# Patient Record
Sex: FEMALE | Race: WHITE | NOT HISPANIC OR LATINO | URBAN - METROPOLITAN AREA
[De-identification: names, ages, dates, MRNs, and addresses within clinical notes are randomized per-mention and may not be internally consistent; named-entity substitution may affect disease eponyms.]

---

## 2020-08-07 ENCOUNTER — EMERGENCY (EMERGENCY)
Facility: HOSPITAL | Age: 60
LOS: 0 days | Discharge: HOME | End: 2020-08-07
Payer: COMMERCIAL

## 2020-08-07 VITALS
RESPIRATION RATE: 16 BRPM | SYSTOLIC BLOOD PRESSURE: 138 MMHG | TEMPERATURE: 98 F | OXYGEN SATURATION: 100 % | DIASTOLIC BLOOD PRESSURE: 59 MMHG | HEART RATE: 78 BPM

## 2020-08-07 VITALS
DIASTOLIC BLOOD PRESSURE: 59 MMHG | RESPIRATION RATE: 16 BRPM | HEART RATE: 78 BPM | SYSTOLIC BLOOD PRESSURE: 135 MMHG | OXYGEN SATURATION: 100 % | TEMPERATURE: 98 F

## 2020-08-07 DIAGNOSIS — Z20.828 CONTACT WITH AND (SUSPECTED) EXPOSURE TO OTHER VIRAL COMMUNICABLE DISEASES: ICD-10-CM

## 2020-08-07 DIAGNOSIS — B34.9 VIRAL INFECTION, UNSPECIFIED: ICD-10-CM

## 2020-08-07 DIAGNOSIS — Z88.5 ALLERGY STATUS TO NARCOTIC AGENT: ICD-10-CM

## 2020-08-07 DIAGNOSIS — R51 HEADACHE: ICD-10-CM

## 2020-08-07 PROCEDURE — 99283 EMERGENCY DEPT VISIT LOW MDM: CPT

## 2020-08-07 PROCEDURE — U0003: CPT

## 2020-08-07 NOTE — ED STATDOCS - PATIENT PORTAL LINK FT
You can access the FollowMyHealth Patient Portal offered by [facility] by registering at the following website: [email or web address]. By joining [employer]’s FollowMyHealth portal, you will also be able to view your health information using other applications (apps) compatible with our system.

## 2020-08-07 NOTE — ED STATDOCS - OBJECTIVE STATEMENT
Pt presents to ED with +headache no fever, no cough, no runny nose, no body aches, no sore throat x 2 days. Pt recently exposed to COVID-19. Pt here for testing.

## 2020-08-08 LAB — SARS-COV-2 RNA SPEC QL NAA+PROBE: SIGNIFICANT CHANGE UP

## 2021-05-16 ENCOUNTER — TRANSCRIPTION ENCOUNTER (OUTPATIENT)
Age: 61
End: 2021-05-16

## 2021-11-17 NOTE — ED ADULT TRIAGE NOTE - MODE OF ARRIVAL
Walk in Dapsone Counseling: I discussed with the patient the risks of dapsone including but not limited to hemolytic anemia, agranulocytosis, rashes, methemoglobinemia, kidney failure, peripheral neuropathy, headaches, GI upset, and liver toxicity.  Patients who start dapsone require monitoring including baseline LFTs and weekly CBCs for the first month, then every month thereafter.  The patient verbalized understanding of the proper use and possible adverse effects of dapsone.  All of the patient's questions and concerns were addressed. High Dose Vitamin A Pregnancy And Lactation Text: High dose vitamin A therapy is contraindicated during pregnancy and breast feeding. Tetracycline Counseling: Patient counseled regarding possible photosensitivity and increased risk for sunburn.  Patient instructed to avoid sunlight, if possible.  When exposed to sunlight, patients should wear protective clothing, sunglasses, and sunscreen.  The patient was instructed to call the office immediately if the following severe adverse effects occur:  hearing changes, easy bruising/bleeding, severe headache, or vision changes.  The patient verbalized understanding of the proper use and possible adverse effects of tetracycline.  All of the patient's questions and concerns were addressed. Patient understands to avoid pregnancy while on therapy due to potential birth defects. Use Enhanced Medication Counseling?: No Birth Control Pills Pregnancy And Lactation Text: This medication should be avoided if pregnant and for the first 30 days post-partum. Spironolactone Pregnancy And Lactation Text: This medication can cause feminization of the male fetus and should be avoided during pregnancy. The active metabolite is also found in breast milk. Sarecycline Counseling: Patient advised regarding possible photosensitivity and discoloration of the teeth, skin, lips, tongue and gums.  Patient instructed to avoid sunlight, if possible.  When exposed to sunlight, patients should wear protective clothing, sunglasses, and sunscreen.  The patient was instructed to call the office immediately if the following severe adverse effects occur:  hearing changes, easy bruising/bleeding, severe headache, or vision changes.  The patient verbalized understanding of the proper use and possible adverse effects of sarecycline.  All of the patient's questions and concerns were addressed. Doxycycline Pregnancy And Lactation Text: This medication is Pregnancy Category D and not consider safe during pregnancy. It is also excreted in breast milk but is considered safe for shorter treatment courses. Tazorac Pregnancy And Lactation Text: This medication is not safe during pregnancy. It is unknown if this medication is excreted in breast milk. Isotretinoin Counseling: Patient should get monthly blood tests, not donate blood, not drive at night if vision affected, not share medication, and not undergo elective surgery for 6 months after tx completed. Side effects reviewed, pt to contact office should one occur. Topical Sulfur Applications Counseling: Topical Sulfur Counseling: Patient counseled that this medication may cause skin irritation or allergic reactions.  In the event of skin irritation, the patient was advised to reduce the amount of the drug applied or use it less frequently.   The patient verbalized understanding of the proper use and possible adverse effects of topical sulfur application.  All of the patient's questions and concerns were addressed. Azithromycin Counseling:  I discussed with the patient the risks of azithromycin including but not limited to GI upset, allergic reaction, drug rash, diarrhea, and yeast infections. Tetracycline Pregnancy And Lactation Text: This medication is Pregnancy Category D and not consider safe during pregnancy. It is also excreted in breast milk. Erythromycin Counseling:  I discussed with the patient the risks of erythromycin including but not limited to GI upset, allergic reaction, drug rash, diarrhea, increase in liver enzymes, and yeast infections. Dapsone Pregnancy And Lactation Text: This medication is Pregnancy Category C and is not considered safe during pregnancy or breast feeding. Topical Retinoid Pregnancy And Lactation Text: This medication is Pregnancy Category C. It is unknown if this medication is excreted in breast milk. Erythromycin Pregnancy And Lactation Text: This medication is Pregnancy Category B and is considered safe during pregnancy. It is also excreted in breast milk. Birth Control Pills Counseling: Birth Control Pill Counseling: I discussed with the patient the potential side effects of OCPs including but not limited to increased risk of stroke, heart attack, thrombophlebitis, deep venous thrombosis, hepatic adenomas, breast changes, GI upset, headaches, and depression.  The patient verbalized understanding of the proper use and possible adverse effects of OCPs. All of the patient's questions and concerns were addressed. Topical Retinoid counseling:  Patient advised to apply a pea-sized amount only at bedtime and wait 30 minutes after washing their face before applying.  If too drying, patient may add a non-comedogenic moisturizer. The patient verbalized understanding of the proper use and possible adverse effects of retinoids.  All of the patient's questions and concerns were addressed. Tazorac Counseling:  Patient advised that medication is irritating and drying.  Patient may need to apply sparingly and wash off after an hour before eventually leaving it on overnight.  The patient verbalized understanding of the proper use and possible adverse effects of tazorac.  All of the patient's questions and concerns were addressed. Azithromycin Pregnancy And Lactation Text: This medication is considered safe during pregnancy and is also secreted in breast milk. Detail Level: Detailed Topical Clindamycin Pregnancy And Lactation Text: This medication is Pregnancy Category B and is considered safe during pregnancy. It is unknown if it is excreted in breast milk. Isotretinoin Pregnancy And Lactation Text: This medication is Pregnancy Category X and is considered extremely dangerous during pregnancy. It is unknown if it is excreted in breast milk. Topical Sulfur Applications Pregnancy And Lactation Text: This medication is Pregnancy Category C and has an unknown safety profile during pregnancy. It is unknown if this topical medication is excreted in breast milk. Minocycline Counseling: Patient advised regarding possible photosensitivity and discoloration of the teeth, skin, lips, tongue and gums.  Patient instructed to avoid sunlight, if possible.  When exposed to sunlight, patients should wear protective clothing, sunglasses, and sunscreen.  The patient was instructed to call the office immediately if the following severe adverse effects occur:  hearing changes, easy bruising/bleeding, severe headache, or vision changes.  The patient verbalized understanding of the proper use and possible adverse effects of minocycline.  All of the patient's questions and concerns were addressed. Benzoyl Peroxide Pregnancy And Lactation Text: This medication is Pregnancy Category C. It is unknown if benzoyl peroxide is excreted in breast milk. Topical Clindamycin Counseling: Patient counseled that this medication may cause skin irritation or allergic reactions.  In the event of skin irritation, the patient was advised to reduce the amount of the drug applied or use it less frequently.   The patient verbalized understanding of the proper use and possible adverse effects of clindamycin.  All of the patient's questions and concerns were addressed. High Dose Vitamin A Counseling: Side effects reviewed, pt to contact office should one occur. Bactrim Counseling:  I discussed with the patient the risks of sulfa antibiotics including but not limited to GI upset, allergic reaction, drug rash, diarrhea, dizziness, photosensitivity, and yeast infections.  Rarely, more serious reactions can occur including but not limited to aplastic anemia, agranulocytosis, methemoglobinemia, blood dyscrasias, liver or kidney failure, lung infiltrates or desquamative/blistering drug rashes. Spironolactone Counseling: Patient advised regarding risks of diarrhea, abdominal pain, hyperkalemia, birth defects (for female patients), liver toxicity and renal toxicity. The patient may need blood work to monitor liver and kidney function and potassium levels while on therapy. The patient verbalized understanding of the proper use and possible adverse effects of spironolactone.  All of the patient's questions and concerns were addressed. Benzoyl Peroxide Counseling: Patient counseled that medicine may cause skin irritation and bleach clothing.  In the event of skin irritation, the patient was advised to reduce the amount of the drug applied or use it less frequently.   The patient verbalized understanding of the proper use and possible adverse effects of benzoyl peroxide.  All of the patient's questions and concerns were addressed. Bactrim Pregnancy And Lactation Text: This medication is Pregnancy Category D and is known to cause fetal risk.  It is also excreted in breast milk. Doxycycline Counseling:  Patient counseled regarding possible photosensitivity and increased risk for sunburn.  Patient instructed to avoid sunlight, if possible.  When exposed to sunlight, patients should wear protective clothing, sunglasses, and sunscreen.  The patient was instructed to call the office immediately if the following severe adverse effects occur:  hearing changes, easy bruising/bleeding, severe headache, or vision changes.  The patient verbalized understanding of the proper use and possible adverse effects of doxycycline.  All of the patient's questions and concerns were addressed.

## 2022-03-10 ENCOUNTER — TRANSCRIPTION ENCOUNTER (OUTPATIENT)
Age: 62
End: 2022-03-10

## 2022-03-10 ENCOUNTER — INPATIENT (INPATIENT)
Facility: HOSPITAL | Age: 62
LOS: 0 days | Discharge: HOME | DRG: 69 | End: 2022-03-11
Attending: INTERNAL MEDICINE | Admitting: FAMILY MEDICINE
Payer: COMMERCIAL

## 2022-03-10 VITALS — WEIGHT: 156.53 LBS | SYSTOLIC BLOOD PRESSURE: 133 MMHG | DIASTOLIC BLOOD PRESSURE: 78 MMHG

## 2022-03-10 DIAGNOSIS — Z90.49 ACQUIRED ABSENCE OF OTHER SPECIFIED PARTS OF DIGESTIVE TRACT: Chronic | ICD-10-CM

## 2022-03-10 DIAGNOSIS — R20.2 PARESTHESIA OF SKIN: ICD-10-CM

## 2022-03-10 LAB
A1C WITH ESTIMATED AVERAGE GLUCOSE RESULT: 5.7 % — HIGH (ref 4–5.6)
ALBUMIN SERPL ELPH-MCNC: 3.8 G/DL — SIGNIFICANT CHANGE UP (ref 3.3–5)
ALP SERPL-CCNC: 70 U/L — SIGNIFICANT CHANGE UP (ref 40–120)
ALT FLD-CCNC: 40 U/L — SIGNIFICANT CHANGE UP (ref 12–78)
ANION GAP SERPL CALC-SCNC: 5 MMOL/L — SIGNIFICANT CHANGE UP (ref 5–17)
APTT BLD: 30 SEC — SIGNIFICANT CHANGE UP (ref 27.5–35.5)
AST SERPL-CCNC: 33 U/L — SIGNIFICANT CHANGE UP (ref 15–37)
BASOPHILS # BLD AUTO: 0.03 K/UL — SIGNIFICANT CHANGE UP (ref 0–0.2)
BASOPHILS NFR BLD AUTO: 0.4 % — SIGNIFICANT CHANGE UP (ref 0–2)
BILIRUB SERPL-MCNC: 0.3 MG/DL — SIGNIFICANT CHANGE UP (ref 0.2–1.2)
BUN SERPL-MCNC: 13 MG/DL — SIGNIFICANT CHANGE UP (ref 7–23)
CALCIUM SERPL-MCNC: 9.2 MG/DL — SIGNIFICANT CHANGE UP (ref 8.5–10.1)
CHLORIDE SERPL-SCNC: 108 MMOL/L — SIGNIFICANT CHANGE UP (ref 96–108)
CO2 SERPL-SCNC: 29 MMOL/L — SIGNIFICANT CHANGE UP (ref 22–31)
CREAT SERPL-MCNC: 0.9 MG/DL — SIGNIFICANT CHANGE UP (ref 0.5–1.3)
EGFR: 71 ML/MIN/1.73M2 — SIGNIFICANT CHANGE UP
EOSINOPHIL # BLD AUTO: 0.27 K/UL — SIGNIFICANT CHANGE UP (ref 0–0.5)
EOSINOPHIL NFR BLD AUTO: 4 % — SIGNIFICANT CHANGE UP (ref 0–6)
ESTIMATED AVERAGE GLUCOSE: 117 MG/DL — HIGH (ref 68–114)
FLUAV AG NPH QL: SIGNIFICANT CHANGE UP
FLUBV AG NPH QL: SIGNIFICANT CHANGE UP
GLUCOSE SERPL-MCNC: 100 MG/DL — HIGH (ref 70–99)
HCT VFR BLD CALC: 43.6 % — SIGNIFICANT CHANGE UP (ref 34.5–45)
HGB BLD-MCNC: 13.7 G/DL — SIGNIFICANT CHANGE UP (ref 11.5–15.5)
IMM GRANULOCYTES NFR BLD AUTO: 0.1 % — SIGNIFICANT CHANGE UP (ref 0–1.5)
INR BLD: 0.98 RATIO — SIGNIFICANT CHANGE UP (ref 0.88–1.16)
LYMPHOCYTES # BLD AUTO: 2.59 K/UL — SIGNIFICANT CHANGE UP (ref 1–3.3)
LYMPHOCYTES # BLD AUTO: 38 % — SIGNIFICANT CHANGE UP (ref 13–44)
MCHC RBC-ENTMCNC: 29.9 PG — SIGNIFICANT CHANGE UP (ref 27–34)
MCHC RBC-ENTMCNC: 31.4 GM/DL — LOW (ref 32–36)
MCV RBC AUTO: 95.2 FL — SIGNIFICANT CHANGE UP (ref 80–100)
MONOCYTES # BLD AUTO: 0.91 K/UL — HIGH (ref 0–0.9)
MONOCYTES NFR BLD AUTO: 13.3 % — SIGNIFICANT CHANGE UP (ref 2–14)
NEUTROPHILS # BLD AUTO: 3.01 K/UL — SIGNIFICANT CHANGE UP (ref 1.8–7.4)
NEUTROPHILS NFR BLD AUTO: 44.2 % — SIGNIFICANT CHANGE UP (ref 43–77)
PLATELET # BLD AUTO: 279 K/UL — SIGNIFICANT CHANGE UP (ref 150–400)
POTASSIUM SERPL-MCNC: 3.8 MMOL/L — SIGNIFICANT CHANGE UP (ref 3.5–5.3)
POTASSIUM SERPL-SCNC: 3.8 MMOL/L — SIGNIFICANT CHANGE UP (ref 3.5–5.3)
PROT SERPL-MCNC: 7.6 GM/DL — SIGNIFICANT CHANGE UP (ref 6–8.3)
PROTHROM AB SERPL-ACNC: 11.4 SEC — SIGNIFICANT CHANGE UP (ref 10.5–13.4)
RBC # BLD: 4.58 M/UL — SIGNIFICANT CHANGE UP (ref 3.8–5.2)
RBC # FLD: 13.2 % — SIGNIFICANT CHANGE UP (ref 10.3–14.5)
RSV RNA NPH QL NAA+NON-PROBE: SIGNIFICANT CHANGE UP
SARS-COV-2 RNA SPEC QL NAA+PROBE: SIGNIFICANT CHANGE UP
SODIUM SERPL-SCNC: 142 MMOL/L — SIGNIFICANT CHANGE UP (ref 135–145)
TROPONIN I, HIGH SENSITIVITY RESULT: 15.19 NG/L — SIGNIFICANT CHANGE UP
TROPONIN I, HIGH SENSITIVITY RESULT: 6.62 NG/L — SIGNIFICANT CHANGE UP
WBC # BLD: 6.82 K/UL — SIGNIFICANT CHANGE UP (ref 3.8–10.5)
WBC # FLD AUTO: 6.82 K/UL — SIGNIFICANT CHANGE UP (ref 3.8–10.5)

## 2022-03-10 PROCEDURE — 0042T: CPT

## 2022-03-10 PROCEDURE — 93306 TTE W/DOPPLER COMPLETE: CPT

## 2022-03-10 PROCEDURE — 36415 COLL VENOUS BLD VENIPUNCTURE: CPT

## 2022-03-10 PROCEDURE — 85027 COMPLETE CBC AUTOMATED: CPT

## 2022-03-10 PROCEDURE — 99285 EMERGENCY DEPT VISIT HI MDM: CPT

## 2022-03-10 PROCEDURE — 70551 MRI BRAIN STEM W/O DYE: CPT | Mod: ME

## 2022-03-10 PROCEDURE — 93010 ELECTROCARDIOGRAM REPORT: CPT

## 2022-03-10 PROCEDURE — G1004: CPT

## 2022-03-10 PROCEDURE — 83036 HEMOGLOBIN GLYCOSYLATED A1C: CPT

## 2022-03-10 PROCEDURE — 86803 HEPATITIS C AB TEST: CPT

## 2022-03-10 PROCEDURE — 97161 PT EVAL LOW COMPLEX 20 MIN: CPT | Mod: GP

## 2022-03-10 PROCEDURE — 99291 CRITICAL CARE FIRST HOUR: CPT

## 2022-03-10 PROCEDURE — 97116 GAIT TRAINING THERAPY: CPT | Mod: GP

## 2022-03-10 PROCEDURE — 80048 BASIC METABOLIC PNL TOTAL CA: CPT

## 2022-03-10 PROCEDURE — 84484 ASSAY OF TROPONIN QUANT: CPT

## 2022-03-10 PROCEDURE — 70551 MRI BRAIN STEM W/O DYE: CPT | Mod: 26

## 2022-03-10 PROCEDURE — 70496 CT ANGIOGRAPHY HEAD: CPT | Mod: 26,MA

## 2022-03-10 PROCEDURE — 99223 1ST HOSP IP/OBS HIGH 75: CPT

## 2022-03-10 PROCEDURE — 82962 GLUCOSE BLOOD TEST: CPT

## 2022-03-10 PROCEDURE — 70498 CT ANGIOGRAPHY NECK: CPT | Mod: 26,MA

## 2022-03-10 PROCEDURE — 80061 LIPID PANEL: CPT

## 2022-03-10 RX ORDER — BUPROPION HYDROCHLORIDE 150 MG/1
1 TABLET, EXTENDED RELEASE ORAL
Qty: 0 | Refills: 0 | DISCHARGE

## 2022-03-10 RX ORDER — AMLODIPINE BESYLATE 2.5 MG/1
5 TABLET ORAL DAILY
Refills: 0 | Status: DISCONTINUED | OUTPATIENT
Start: 2022-03-10 | End: 2022-03-11

## 2022-03-10 RX ORDER — LOSARTAN POTASSIUM 100 MG/1
50 TABLET, FILM COATED ORAL DAILY
Refills: 0 | Status: DISCONTINUED | OUTPATIENT
Start: 2022-03-10 | End: 2022-03-11

## 2022-03-10 RX ORDER — ATORVASTATIN CALCIUM 80 MG/1
20 TABLET, FILM COATED ORAL AT BEDTIME
Refills: 0 | Status: DISCONTINUED | OUTPATIENT
Start: 2022-03-10 | End: 2022-03-10

## 2022-03-10 RX ORDER — ONDANSETRON 8 MG/1
4 TABLET, FILM COATED ORAL EVERY 6 HOURS
Refills: 0 | Status: DISCONTINUED | OUTPATIENT
Start: 2022-03-10 | End: 2022-03-10

## 2022-03-10 RX ORDER — CHOLECALCIFEROL (VITAMIN D3) 125 MCG
1 CAPSULE ORAL
Qty: 0 | Refills: 0 | DISCHARGE

## 2022-03-10 RX ORDER — BUPROPION HYDROCHLORIDE 150 MG/1
300 TABLET, EXTENDED RELEASE ORAL DAILY
Refills: 0 | Status: DISCONTINUED | OUTPATIENT
Start: 2022-03-10 | End: 2022-03-11

## 2022-03-10 RX ORDER — METHYLPHENIDATE HCL 5 MG
1 TABLET ORAL
Qty: 0 | Refills: 0 | DISCHARGE

## 2022-03-10 RX ORDER — ATORVASTATIN CALCIUM 80 MG/1
1 TABLET, FILM COATED ORAL
Qty: 0 | Refills: 0 | DISCHARGE

## 2022-03-10 RX ORDER — ONDANSETRON 8 MG/1
4 TABLET, FILM COATED ORAL EVERY 6 HOURS
Refills: 0 | Status: DISCONTINUED | OUTPATIENT
Start: 2022-03-10 | End: 2022-03-11

## 2022-03-10 RX ORDER — METHYLPHENIDATE HCL 5 MG
36 TABLET ORAL DAILY
Refills: 0 | Status: DISCONTINUED | OUTPATIENT
Start: 2022-03-10 | End: 2022-03-11

## 2022-03-10 RX ORDER — ENOXAPARIN SODIUM 100 MG/ML
40 INJECTION SUBCUTANEOUS EVERY 24 HOURS
Refills: 0 | Status: DISCONTINUED | OUTPATIENT
Start: 2022-03-10 | End: 2022-03-11

## 2022-03-10 RX ORDER — FLUTICASONE PROPIONATE 50 MCG
1 SPRAY, SUSPENSION NASAL EVERY 12 HOURS
Refills: 0 | Status: DISCONTINUED | OUTPATIENT
Start: 2022-03-10 | End: 2022-03-11

## 2022-03-10 RX ORDER — FLUTICASONE PROPIONATE 50 MCG
1 SPRAY, SUSPENSION NASAL
Qty: 0 | Refills: 0 | DISCHARGE

## 2022-03-10 RX ORDER — OLMESARTAN MEDOXOMIL 5 MG/1
1 TABLET, FILM COATED ORAL
Qty: 0 | Refills: 0 | DISCHARGE

## 2022-03-10 RX ORDER — ACETAMINOPHEN 500 MG
650 TABLET ORAL EVERY 6 HOURS
Refills: 0 | Status: DISCONTINUED | OUTPATIENT
Start: 2022-03-10 | End: 2022-03-11

## 2022-03-10 RX ORDER — AMLODIPINE BESYLATE 2.5 MG/1
1 TABLET ORAL
Qty: 0 | Refills: 0 | DISCHARGE

## 2022-03-10 RX ORDER — MECLIZINE HCL 12.5 MG
25 TABLET ORAL EVERY 6 HOURS
Refills: 0 | Status: DISCONTINUED | OUTPATIENT
Start: 2022-03-10 | End: 2022-03-11

## 2022-03-10 RX ORDER — LANOLIN ALCOHOL/MO/W.PET/CERES
3 CREAM (GRAM) TOPICAL ONCE
Refills: 0 | Status: COMPLETED | OUTPATIENT
Start: 2022-03-10 | End: 2022-03-10

## 2022-03-10 RX ORDER — ASPIRIN/CALCIUM CARB/MAGNESIUM 324 MG
81 TABLET ORAL DAILY
Refills: 0 | Status: DISCONTINUED | OUTPATIENT
Start: 2022-03-10 | End: 2022-03-11

## 2022-03-10 RX ORDER — ATORVASTATIN CALCIUM 80 MG/1
80 TABLET, FILM COATED ORAL AT BEDTIME
Refills: 0 | Status: DISCONTINUED | OUTPATIENT
Start: 2022-03-10 | End: 2022-03-11

## 2022-03-10 RX ORDER — PANTOPRAZOLE SODIUM 20 MG/1
40 TABLET, DELAYED RELEASE ORAL
Refills: 0 | Status: DISCONTINUED | OUTPATIENT
Start: 2022-03-10 | End: 2022-03-11

## 2022-03-10 RX ADMIN — Medication 81 MILLIGRAM(S): at 17:44

## 2022-03-10 RX ADMIN — ATORVASTATIN CALCIUM 80 MILLIGRAM(S): 80 TABLET, FILM COATED ORAL at 21:49

## 2022-03-10 RX ADMIN — AMLODIPINE BESYLATE 5 MILLIGRAM(S): 2.5 TABLET ORAL at 17:43

## 2022-03-10 RX ADMIN — ENOXAPARIN SODIUM 40 MILLIGRAM(S): 100 INJECTION SUBCUTANEOUS at 15:18

## 2022-03-10 RX ADMIN — Medication 3 MILLIGRAM(S): at 21:49

## 2022-03-10 RX ADMIN — LOSARTAN POTASSIUM 50 MILLIGRAM(S): 100 TABLET, FILM COATED ORAL at 17:44

## 2022-03-10 RX ADMIN — Medication 1 SPRAY(S): at 21:48

## 2022-03-10 NOTE — PHYSICAL THERAPY INITIAL EVALUATION ADULT - GENERAL OBSERVATIONS, REHAB EVAL
pt rec'd supine in bed on 3E, HM, dtr arriving. pt reports feeling better, no dizziness, still paresthesia LUE.

## 2022-03-10 NOTE — PATIENT PROFILE ADULT - IS PATIENT POST-MENOPAUSAL?
Phyllis Loving NP calling re: abnormal echo with changes from previous echo done on Mr narayan.    Spoke with Ludivina BREWER reviewed Phyllis Lyles concerns.  States that EF has improved. Echo does show changes from previous. Instructed that per Ludivina ok to f/u in 2 wks unless patient becomes symptomatic such as SOB.    Information given to Phyllis BREWER                 yes

## 2022-03-10 NOTE — ED ADULT TRIAGE NOTE - CHIEF COMPLAINT QUOTE
Pt p/w c/o left arm weakness and left facial numbness/tingling started approximately 45 minutes ago +BEFAST, code stroke activated.

## 2022-03-10 NOTE — ED PROVIDER NOTE - CROS ED NEURO ALL NEG
ADULT NUTRITION INITIAL ASSESSMENT    Pt is at moderate nutrition risk. Pt does not meet malnutrition criteria.       RECOMMENDATIONS TO MD:  See Nutrition Intervention     NUTRITION DIAGNOSIS/PROBLEM:  Altered GI function related to alteration in gastro N/A  Intake: N/A    Intake Meeting Needs: No    Food Allergies: No  Cultural/Ethnic/Yazidism Preferences: Not Obtained    MEDICATIONS: reviewed    LABS: reviewed    NUTRITION RELATED PHYSICAL FINDINGS:  - Body Fat/Muscle Mass: well nourished per visual ex - - -

## 2022-03-10 NOTE — STROKE CODE NOTE - NIH STROKE SCALE: 3. VISUAL, QM
Pneumonia
Atrial fibrillation, unspecified type
Atrial fibrillation, unspecified type
HTN (hypertension)
(0) No visual loss

## 2022-03-10 NOTE — STROKE CODE NOTE - NSMDCONSULT QTN_Y FT
Patient is a 62y old Female who presents with a chief complaint of left arm numbness and left facial numbness, dizziness    Time patient arrived to ED: 11:47    HPI: 62 yr old F with PMHx of HTN, HLD, and depression presented to ED on 3/10/22 at 11:47 am with c/o left arm paresthesias and left facial paresthesias as well as dizziness, LKN was at 2 pm on 3/9. Patient states yesterday afternoon she began to experience dizziness and nausea, room felt like it was spinning and dizziness worsened with head movement, which lasted for about 4-5 hours. When she woke up today, dizziness had improved but her head still felt 'fuzzy,' and then about an hr before she arrived in ED she began to experience numbness and tingling in her left arm, followed by left facial numbness and tingling. Her daughter insisted she come to ED d/t worsening symptoms.    In ED, code stroke was called. CTH showed no acute infarct or hemorrhage, CTA head and neck showed no LVO, stenosis or aneurysms, CT perfusion showed no core infarct. IV TPA was not given because patient was out of the appropriate time window for IV alteplase, sx had lasted > 4.5 hrs, and pt's sx were minor and non disabling, NIHSS 1.      Upon evaluation, patient is awake and alert, denies dizziness but admits to left facial paresthesias, as well as left arm numbness/tingling. She does admit to neck pain, but denies radiating pain from neck to LUE. Reports she gets neck pain when stressed. Currently she denies any headache, visual changes, changes in speech. She had a similar episode of vertigo in 2016, but is not sure what caused it. She does not take baby ASA at home but did take a baby ASA prior to arriving here.    PAST MEDICAL & SURGICAL HISTORY:  HTN  HLD  Depression    FAMILY HISTORY: Noncontributory     Social Hx:  Nonsmoker, no drug or alcohol use    Allergies:  Demerol HCl (Unknown)    MEDICATIONS  (Home):  Amlodipine  Statin  Wellbutrin  Losartan    ROS: Pertinent positives in HPI, all other ROS were reviewed and are negative.      Vital Signs Last 24 Hrs  T(C): 36.7 (10 Mar 2022 12:11), Max: 36.7 (10 Mar 2022 12:11)  T(F): 98.1 (10 Mar 2022 12:11), Max: 98.1 (10 Mar 2022 12:11)  HR: 81 (10 Mar 2022 12:11) (81 - 81)  BP: 159/99 (10 Mar 2022 12:11) (133/78 - 159/99)  BP(mean): --  RR: --  SpO2: 100% (10 Mar 2022 12:11) (100% - 100%)    Physical Exam:  Gen: NAD, normocephalic  HEENT: PERRLA, EOMI  Neck: Supple  Respiratory: Breath sounds are clear bilaterally  Cardiovascular: S1 and S2, regular rhythm  Extremities:  no edema  Vascular: No carotid Bruit  Musculoskeletal: no joint swelling/tenderness, no abnormal movements  Skin: No rashes    Neurological Exam:  HF: A x O x 3, appropriately interactive, normal affect, speech fluent, no aphasia or paraphasic errors. Naming /repetition intact   CN: PERRL, EOMI, VFF, facial sensation decreased on left side compared to right side, no NLFD, tongue midline  Motor: No pronator drift, Strength 5/5 in all 4 ext, normal bulk and tone, no tremor, rigidity or bradykinesia  Sens: Intact to light touch throughout except for face  Reflexes: Symmetric and normal, downgoing toes b/l  Coord:  No FNFA, dysmetria  Gait/Balance: Cannot test    NIHSS- 1 for subjective sensory deficit    RESULT SUMMARY:  1 points  NIH Stroke Scale      INPUTS:  1A: Level of consciousness —> 0 = Alert; keenly responsive  1B: Ask month and age —> 0 = Both questions right  1C: 'Blink eyes' & 'squeeze hands' —> 0 = Performs both tasks  2: Horizontal extraocular movements —> 0 = Normal  3: Visual fields —> 0 = No visual loss  4: Facial palsy —> 0 = Normal symmetry  5A: Left arm motor drift —> 0 = No drift for 10 seconds  5B: Right arm motor drift —> 0 = No drift for 10 seconds  6A: Left leg motor drift —> 0 = No drift for 5 seconds  6B: Right leg motor drift —> 0 = No drift for 5 seconds  7: Limb Ataxia —> 0 = No ataxia  8: Sensation —> 1 = Mild-moderate loss: less sharp/more dull   9: Language/aphasia —> 0 = Normal; no aphasia  10: Dysarthria —> 0 = Normal  11: Extinction/inattention —> 0 = No abnormality    RESULT SUMMARY:  0 points  Modified [place] Scale      INPUTS:  Patient's Baseline Activity —> 0 = No symptoms at all    Labs:                        13.7   6.82  )-----------( 279      ( 10 Mar 2022 12:00 )             43.6       Radiology:  CT head/CTA head and neck/CT Perfusion reports (3/10/22):        A/P: 62 yr old F with PMHx of HTN, HLD, and depression presented to ED on 3/10/22 at 11:47 am with c/o left arm paresthesias and left facial paresthesias as well as dizziness, LKN was at 2 pm on 3/9.    CTH showed no acute infarct or hemorrhage, CTA head and neck showed no LVO, stenosis or aneurysms, CT perfusion showed no core infarct.    IV TPA was not given because patient was out of the appropriate time window for IV alteplase, sx had lasted > 4.5 hrs, and pt's sx were minor and non disabling, NIHSS 1.      #R/o acute right sided CVA, pt with risk factors    - MRI head ordered  - ASA 81 mg QD  - Atorvastatin, lipid profile, Hgb A1c within 24 hours  - Monitor HTN- maintain systolic bp 170-190, no less than 120  - Neuro checks Q4h  - Vital signs Q4h  - Echo  - PT eval  - Dysphagia screen today  - DVT prophylaxis  - Telemonitoring    Patient was discussed with Dr. Alegre Patient is a 62y old Female who presents with a chief complaint of left arm numbness and left facial numbness, dizziness    Time patient arrived to ED: 11:47    HPI: 62 yr old F with PMHx of HTN, HLD, and depression presented to ED on 3/10/22 at 11:47 am with c/o left arm paresthesias and left facial paresthesias as well as dizziness, LKN was at 2 pm on 3/9. Patient states yesterday afternoon she began to experience dizziness and nausea, room felt like it was spinning and dizziness worsened with head movement, which lasted for about 4-5 hours. When she woke up today, dizziness had improved but her head still felt 'fuzzy,' and then about an hr before she arrived in ED she began to experience numbness and tingling in her left arm, followed by left facial numbness and tingling. Her daughter insisted she come to ED d/t worsening symptoms.    In ED, code stroke was called. CTH showed no acute infarct or hemorrhage, CTA head and neck showed no LVO, stenosis or aneurysms, CT perfusion showed no core infarct. IV TPA was not given because patient was out of the appropriate time window for IV alteplase, sx had lasted > 4.5 hrs, and pt's sx were minor and non disabling, NIHSS 1.      Upon evaluation, patient is awake and alert, denies dizziness but admits to left facial paresthesias, as well as left arm numbness/tingling. She does admit to neck pain, but denies radiating pain from neck to LUE. Reports she gets neck pain when stressed. Currently she denies any headache, visual changes, changes in speech. She had a similar episode of vertigo in 2016, but is not sure what caused it. She does not take baby ASA at home but did take a baby ASA prior to arriving here.    PAST MEDICAL & SURGICAL HISTORY:  HTN  HLD  Depression    FAMILY HISTORY: Noncontributory     Social Hx:  Nonsmoker, no drug or alcohol use    Allergies:  Demerol HCl (Unknown)    MEDICATIONS  (Home):  Amlodipine  Statin  Wellbutrin  Losartan    ROS: Pertinent positives in HPI, all other ROS were reviewed and are negative.      Vital Signs Last 24 Hrs  T(C): 36.7 (10 Mar 2022 12:11), Max: 36.7 (10 Mar 2022 12:11)  T(F): 98.1 (10 Mar 2022 12:11), Max: 98.1 (10 Mar 2022 12:11)  HR: 81 (10 Mar 2022 12:11) (81 - 81)  BP: 159/99 (10 Mar 2022 12:11) (133/78 - 159/99)  BP(mean): --  RR: --  SpO2: 100% (10 Mar 2022 12:11) (100% - 100%)    Physical Exam:  Gen: NAD, normocephalic  HEENT: PERRLA, EOMI  Neck: Supple  Respiratory: Breath sounds are clear bilaterally  Cardiovascular: S1 and S2, regular rhythm  Extremities:  no edema  Vascular: No carotid Bruit  Musculoskeletal: no joint swelling/tenderness, no abnormal movements  Skin: No rashes    Neurological Exam:  HF: A x O x 3, appropriately interactive, normal affect, speech fluent, no aphasia or paraphasic errors. Naming /repetition intact   CN: PERRL, EOMI, VFF, facial sensation decreased on left side compared to right side, no NLFD, tongue midline  Motor: No pronator drift, Strength 5/5 in all 4 ext, normal bulk and tone, no tremor, rigidity or bradykinesia  Sens: Intact to light touch throughout except for face  Reflexes: Symmetric and normal, downgoing toes b/l  Coord:  No FNFA, dysmetria  Gait/Balance: Cannot test    NIHSS- 1 for subjective sensory deficit    RESULT SUMMARY:  1 points  NIH Stroke Scale      INPUTS:  1A: Level of consciousness —> 0 = Alert; keenly responsive  1B: Ask month and age —> 0 = Both questions right  1C: 'Blink eyes' & 'squeeze hands' —> 0 = Performs both tasks  2: Horizontal extraocular movements —> 0 = Normal  3: Visual fields —> 0 = No visual loss  4: Facial palsy —> 0 = Normal symmetry  5A: Left arm motor drift —> 0 = No drift for 10 seconds  5B: Right arm motor drift —> 0 = No drift for 10 seconds  6A: Left leg motor drift —> 0 = No drift for 5 seconds  6B: Right leg motor drift —> 0 = No drift for 5 seconds  7: Limb Ataxia —> 0 = No ataxia  8: Sensation —> 1 = Mild-moderate loss: less sharp/more dull   9: Language/aphasia —> 0 = Normal; no aphasia  10: Dysarthria —> 0 = Normal  11: Extinction/inattention —> 0 = No abnormality    RESULT SUMMARY:  0 points  Modified [place] Scale      INPUTS:  Patient's Baseline Activity —> 0 = No symptoms at all    Labs:                        13.7   6.82  )-----------( 279      ( 10 Mar 2022 12:00 )             43.6       Radiology:  CT head/CTA head and neck/CT Perfusion reports (3/10/22):        A/P: 62 yr old F with PMHx of HTN, HLD, and depression presented to ED on 3/10/22 at 11:47 am with c/o left arm paresthesias and left facial paresthesias as well as dizziness, LKN was at 2 pm on 3/9.    CTH showed no acute infarct or hemorrhage, CTA head and neck showed no LVO, stenosis or aneurysms, CT perfusion showed no core infarct.    IV TPA was not given because patient was out of the appropriate time window for IV alteplase, sx had lasted > 4.5 hrs, and pt's sx were minor and non disabling, NIHSS 1.      #R/o acute right sided CVA, pt with risk factors    - MRI head ordered  - ASA 81 mg QD  - Atorvastatin, lipid profile, Hgb A1c within 24 hours  - Monitor HTN- maintain systolic bp 170-190, no less than 120  - Neuro checks Q4h  - Vital signs Q4h  - Monitor blood glucose Q6h for first 24 hrs  - Echo  - PT eval  - Dysphagia screen today  - DVT prophylaxis  - Telemonitoring    Patient was discussed with Dr. Alegre Patient is a 62y old Female who presents with a chief complaint of left arm numbness and left facial numbness, dizziness    Time patient arrived to ED: 11:47    HPI: 62 yr old F with PMHx of HTN, HLD, and depression presented to ED on 3/10/22 at 11:47 am with c/o left arm paresthesias and left facial paresthesias as well as dizziness, LKN was at 2 pm on 3/9. Patient states yesterday afternoon she began to experience dizziness and nausea, room felt like it was spinning and dizziness worsened with head movement, which lasted for about 4-5 hours. When she woke up today, dizziness had improved but her head still felt 'fuzzy,' and then about an hr before she arrived in ED she began to experience numbness and tingling in her left arm, followed by left facial numbness and tingling. Her daughter insisted she come to ED d/t worsening symptoms.    In ED, code stroke was called. CTH showed no acute infarct or hemorrhage, CTA head and neck showed no LVO, stenosis or aneurysms, CT perfusion showed no core infarct. IV TPA was not given because patient was out of the appropriate time window for IV alteplase, sx had lasted > 4.5 hrs, and pt's sx were minor and non disabling, NIHSS 1.      Upon evaluation, patient is awake and alert, denies dizziness but admits to left facial paresthesias, as well as left arm numbness/tingling. She does admit to neck pain, but denies radiating pain from neck to LUE. Reports she gets neck pain when stressed. Currently she denies any headache, visual changes, changes in speech. She had a similar episode of vertigo in 2016, but is not sure what caused it. She does not take baby ASA at home but did take a baby ASA prior to arriving here.    PAST MEDICAL & SURGICAL HISTORY:  HTN  HLD  Depression    FAMILY HISTORY: Noncontributory     Social Hx:  Nonsmoker, no drug or alcohol use    Allergies:  Demerol HCl (Unknown)    MEDICATIONS  (Home):  Amlodipine  Statin  Wellbutrin  Losartan    ROS: Pertinent positives in HPI, all other ROS were reviewed and are negative.      Vital Signs Last 24 Hrs  T(C): 36.7 (10 Mar 2022 12:11), Max: 36.7 (10 Mar 2022 12:11)  T(F): 98.1 (10 Mar 2022 12:11), Max: 98.1 (10 Mar 2022 12:11)  HR: 81 (10 Mar 2022 12:11) (81 - 81)  BP: 159/99 (10 Mar 2022 12:11) (133/78 - 159/99)  BP(mean): --  RR: --  SpO2: 100% (10 Mar 2022 12:11) (100% - 100%)    Physical Exam:  Gen: NAD, normocephalic  HEENT: PERRLA, EOMI  Neck: Supple  Respiratory: Breath sounds are clear bilaterally  Cardiovascular: S1 and S2, regular rhythm  Extremities:  no edema  Vascular: No carotid Bruit  Musculoskeletal: no joint swelling/tenderness, no abnormal movements  Skin: No rashes    Neurological Exam:  HF: A x O x 3, appropriately interactive, normal affect, speech fluent, no aphasia or paraphasic errors. Naming /repetition intact   CN: PERRL, EOMI, VFF, facial sensation decreased on left side compared to right side, no NLFD, tongue midline  Motor: No pronator drift, Strength 5/5 in all 4 ext, normal bulk and tone, no tremor, rigidity or bradykinesia  Sens: Intact to light touch throughout except for face  Reflexes: Symmetric and normal, downgoing toes b/l  Coord:  No FNFA, dysmetria  Gait/Balance: Cannot test    NIHSS- 1 for subjective sensory deficit    RESULT SUMMARY:  1 points  NIH Stroke Scale      INPUTS:  1A: Level of consciousness —> 0 = Alert; keenly responsive  1B: Ask month and age —> 0 = Both questions right  1C: 'Blink eyes' & 'squeeze hands' —> 0 = Performs both tasks  2: Horizontal extraocular movements —> 0 = Normal  3: Visual fields —> 0 = No visual loss  4: Facial palsy —> 0 = Normal symmetry  5A: Left arm motor drift —> 0 = No drift for 10 seconds  5B: Right arm motor drift —> 0 = No drift for 10 seconds  6A: Left leg motor drift —> 0 = No drift for 5 seconds  6B: Right leg motor drift —> 0 = No drift for 5 seconds  7: Limb Ataxia —> 0 = No ataxia  8: Sensation —> 1 = Mild-moderate loss: less sharp/more dull   9: Language/aphasia —> 0 = Normal; no aphasia  10: Dysarthria —> 0 = Normal  11: Extinction/inattention —> 0 = No abnormality    RESULT SUMMARY:  0 points  Modified [place] Scale      INPUTS:  Patient's Baseline Activity —> 0 = No symptoms at all    Labs:                        13.7   6.82  )-----------( 279      ( 10 Mar 2022 12:00 )             43.6       Radiology:  CT head/CTA head and neck/CT Perfusion reports (3/10/22):    IMPRESSION:    HEAD CT: No acute intracranial hemorrhage or acute territorial infarction.    CT PERFUSION demonstrated: No asymmetric or territorial perfusion   abnormality.    If symptoms persist consider follow up head CT or MRI, MRA  if no   contraindication.    CTA COW:  Patent intracranial circulation without flow limiting stenosis   or large vessel occlusion.    CTA NECK: Patent, ECAs, ICAs, no  hemodynamically significant stenosis at    ICA origins by NASCET criteria.  Bilateral vertebral arteries are patent without flow limiting stenosis.      A/P: 62 yr old F with PMHx of HTN, HLD, and depression presented to ED on 3/10/22 at 11:47 am with c/o left arm paresthesias and left facial paresthesias as well as dizziness, LKN was at 2 pm on 3/9.    CTH showed no acute infarct or hemorrhage, CTA head and neck showed no LVO, stenosis or aneurysms, CT perfusion showed no core infarct.    IV TPA was not given because patient was out of the appropriate time window for IV alteplase, sx had lasted > 4.5 hrs, and pt's sx were minor and non disabling, NIHSS 1.      #R/o acute right sided CVA, pt with risk factors    - MRI head ordered  - ASA 81 mg QD  - Atorvastatin, lipid profile, Hgb A1c within 24 hours  - Monitor HTN- maintain systolic bp 170-190, no less than 120  - Neuro checks Q4h  - Vital signs Q4h  - Monitor blood glucose Q6h for first 24 hrs  - Echo  - PT eval  - Dysphagia screen today  - DVT prophylaxis  - Telemonitoring    Patient was discussed with Dr. Alegre and Dr. Miller

## 2022-03-10 NOTE — DISCHARGE NOTE NURSING/CASE MANAGEMENT/SOCIAL WORK - NSDCPEFALRISK_GEN_ALL_CORE
For information on Fall & Injury Prevention, visit: [email or web address] OR  [email or web address] OR  https://www.cdc.gov/steadi/patient.html

## 2022-03-10 NOTE — PHYSICAL THERAPY INITIAL EVALUATION ADULT - MANUAL MUSCLE TESTING RESULTS, REHAB EVAL
slight (<1/2 gr.) difference LUE < R likely d/t hand dominance-R HD)/no strength deficits were identified

## 2022-03-10 NOTE — ED PROVIDER NOTE - CLINICAL SUMMARY MEDICAL DECISION MAKING FREE TEXT BOX
62 year old femael with neuro symptoms that have resolved on their own, not a TPA candidate, seen by neuro and admission recommended.

## 2022-03-10 NOTE — ED PROVIDER NOTE - CRITICAL CARE ATTENDING CONTRIBUTION TO CARE
62 year old female with left arm and face tingling, normal motor function here and pt is asymptomatic so TPA is not indicated. Seen by neuro. Precautions reviewed.

## 2022-03-10 NOTE — ED PROVIDER NOTE - OBJECTIVE STATEMENT
61 y/o female with no pertinent PMHx presents to ED c/o left arm weakness and left facial numbing/tingling approx 45 min PTA. Pt reports episode of dizziness yesterday around 2pm, persistent for a few hours. Also with left sided neck discomfort, reports similar pain when stressed. Denies difficulty walking, changes in vision, confusion.

## 2022-03-10 NOTE — ED PROVIDER NOTE - NEUROLOGICAL, MLM
Alert and oriented, no focal deficits, no motor or sensory deficits, CN 2-12 grossly intact, normal finger to nose, no pronator drift

## 2022-03-10 NOTE — ED ADULT NURSE NOTE - OBJECTIVE STATEMENT
pt presents to ed ambulatory for evaluation of left arm weakness and left facial numbness/tingling approx. 45 PTA- pt reports persistent dizziness since 2pm yesterday. in ed , pt ambulatory steady gait, gcs 15, no change in vision. NO TPA as per neuro pa and physician at bedside. pt vitals stable , ekg done,

## 2022-03-10 NOTE — H&P ADULT - ASSESSMENT
1. Dizziness/vertigo + LUE/Left facial numbness and chest sensation - more consistent with peripheral vestibulopathy, but can't rule out central etiology - obtain MRI brain, cont statin and ASA    2. LUE/Left facial numbness and chest sensation - f/u series CE, TTE   - no acute EKG changes found    3. HTN - cont current meds    4. Depression on wellbutrin    5. VTE proph - LMWH    Discussed with  at bedside.  Time spent 56 min

## 2022-03-10 NOTE — PATIENT PROFILE ADULT - FALL HARM RISK - HARM RISK INTERVENTIONS
Communicate Risk of Fall with Harm to all staff/Monitor gait and stability/Reinforce activity limits and safety measures with patient and family/Tailored Fall Risk Interventions/Visual Cue: Yellow wristband and red socks/Bed in lowest position, wheels locked, appropriate side rails in place/Call bell, personal items and telephone in reach/Instruct patient to call for assistance before getting out of bed or chair/Non-slip footwear when patient is out of bed/[place] to call system/Physically safe environment - no spills, clutter or unnecessary equipment/Purposeful Proactive Rounding/Room/bathroom lighting operational, light cord in reach

## 2022-03-10 NOTE — H&P ADULT - HISTORY OF PRESENT ILLNESS
62 y.o. female with PMHx of HTN/HLD/Depression p/w sudden onset of severe dizziness with inability to ambulate and severe nausea day prior improved with dramamine followed by LUE/Left facial numbness this am - now is better. Pt also reported some lower subxyphoid chest sensation - resolved now.

## 2022-03-10 NOTE — PHARMACOTHERAPY INTERVENTION NOTE - COMMENTS
Medication history complete, [facility] with [facility]

## 2022-03-10 NOTE — H&P ADULT - NSHPPHYSICALEXAM_GEN_ALL_CORE
PHYSICAL EXAM:    General: Well developed; well nourished; in no acute distress  Eyes: PERRLA, EOMI; conjunctiva and sclera clear  Head: Normocephalic; atraumatic  ENMT: No nasal discharge; airway clear, + [place] maneuver  Neck: Supple; non tender; no masses  Respiratory: No wheezes, rales or rhonchi  Cardiovascular: Regular rate and rhythm. S1 and S2 Normal; No murmurs, gallops or rubs  Gastrointestinal: Soft non-tender non-distended; Normal bowel sounds  Genitourinary: No costovertebral angle tenderness  Extremities: Normal range of motion, No clubbing, cyanosis or edema  Vascular: Peripheral pulses palpable 2+ bilaterally  Neurological: Alert and oriented x4, brisk LE DTRs, no deficits  Skin: Warm and dry.   Musculoskeletal: Normal tone, without deformities  Psychiatric: Cooperative and appropriate

## 2022-03-10 NOTE — PHYSICAL THERAPY INITIAL EVALUATION ADULT - PERTINENT HX OF CURRENT PROBLEM, REHAB EVAL
pt to ED on 3/10/22  a.m w/ c/o L arm paresthesias and left facial paresthesias as well as dizziness, LKN was at 2 pm on 3/9. yesterday pt w/ dizziness and nausea, dizziness worsened with head movement, which lasted for about 4-5 hours. also had c/o substernal "sensation". In ED, code stroke was called. CTH showed no acute infarct or hemorrhage, CTA head and neck showed no LVO, stenosis or aneurysms, CT perfusion showed no core infarct. trop neg. no EKG changes. MRI pending.

## 2022-03-11 ENCOUNTER — TRANSCRIPTION ENCOUNTER (OUTPATIENT)
Age: 62
End: 2022-03-11

## 2022-03-11 VITALS
DIASTOLIC BLOOD PRESSURE: 50 MMHG | HEART RATE: 69 BPM | RESPIRATION RATE: 18 BRPM | OXYGEN SATURATION: 99 % | SYSTOLIC BLOOD PRESSURE: 110 MMHG | TEMPERATURE: 98 F

## 2022-03-11 DIAGNOSIS — I49.3 VENTRICULAR PREMATURE DEPOLARIZATION: ICD-10-CM

## 2022-03-11 DIAGNOSIS — R07.89 OTHER CHEST PAIN: ICD-10-CM

## 2022-03-11 LAB
ANION GAP SERPL CALC-SCNC: 4 MMOL/L — LOW (ref 5–17)
BUN SERPL-MCNC: 14 MG/DL — SIGNIFICANT CHANGE UP (ref 7–23)
CALCIUM SERPL-MCNC: 9.2 MG/DL — SIGNIFICANT CHANGE UP (ref 8.5–10.1)
CHLORIDE SERPL-SCNC: 107 MMOL/L — SIGNIFICANT CHANGE UP (ref 96–108)
CHOLEST SERPL-MCNC: 180 MG/DL — SIGNIFICANT CHANGE UP
CO2 SERPL-SCNC: 29 MMOL/L — SIGNIFICANT CHANGE UP (ref 22–31)
CREAT SERPL-MCNC: 0.82 MG/DL — SIGNIFICANT CHANGE UP (ref 0.5–1.3)
EGFR: 80 ML/MIN/1.73M2 — SIGNIFICANT CHANGE UP
GLUCOSE SERPL-MCNC: 98 MG/DL — SIGNIFICANT CHANGE UP (ref 70–99)
HCT VFR BLD CALC: 40.9 % — SIGNIFICANT CHANGE UP (ref 34.5–45)
HCV AB S/CO SERPL IA: 0.06 S/CO — SIGNIFICANT CHANGE UP (ref 0–0.99)
HCV AB SERPL-IMP: SIGNIFICANT CHANGE UP
HDLC SERPL-MCNC: 83 MG/DL — SIGNIFICANT CHANGE UP
HGB BLD-MCNC: 13.2 G/DL — SIGNIFICANT CHANGE UP (ref 11.5–15.5)
LIPID PNL WITH DIRECT LDL SERPL: 85 MG/DL — SIGNIFICANT CHANGE UP
MCHC RBC-ENTMCNC: 30.6 PG — SIGNIFICANT CHANGE UP (ref 27–34)
MCHC RBC-ENTMCNC: 32.3 GM/DL — SIGNIFICANT CHANGE UP (ref 32–36)
MCV RBC AUTO: 94.9 FL — SIGNIFICANT CHANGE UP (ref 80–100)
NON HDL CHOLESTEROL: 97 MG/DL — SIGNIFICANT CHANGE UP
PLATELET # BLD AUTO: 259 K/UL — SIGNIFICANT CHANGE UP (ref 150–400)
POTASSIUM SERPL-MCNC: 3.9 MMOL/L — SIGNIFICANT CHANGE UP (ref 3.5–5.3)
POTASSIUM SERPL-SCNC: 3.9 MMOL/L — SIGNIFICANT CHANGE UP (ref 3.5–5.3)
RBC # BLD: 4.31 M/UL — SIGNIFICANT CHANGE UP (ref 3.8–5.2)
RBC # FLD: 13.2 % — SIGNIFICANT CHANGE UP (ref 10.3–14.5)
SODIUM SERPL-SCNC: 140 MMOL/L — SIGNIFICANT CHANGE UP (ref 135–145)
TRIGL SERPL-MCNC: 56 MG/DL — SIGNIFICANT CHANGE UP
WBC # BLD: 6.69 K/UL — SIGNIFICANT CHANGE UP (ref 3.8–10.5)
WBC # FLD AUTO: 6.69 K/UL — SIGNIFICANT CHANGE UP (ref 3.8–10.5)

## 2022-03-11 PROCEDURE — 99232 SBSQ HOSP IP/OBS MODERATE 35: CPT

## 2022-03-11 PROCEDURE — 99223 1ST HOSP IP/OBS HIGH 75: CPT

## 2022-03-11 PROCEDURE — 99239 HOSP IP/OBS DSCHRG MGMT >30: CPT

## 2022-03-11 PROCEDURE — 93306 TTE W/DOPPLER COMPLETE: CPT | Mod: 26

## 2022-03-11 RX ORDER — ASPIRIN/CALCIUM CARB/MAGNESIUM 324 MG
1 TABLET ORAL
Qty: 0 | Refills: 0 | DISCHARGE
Start: 2022-03-11

## 2022-03-11 RX ADMIN — BUPROPION HYDROCHLORIDE 300 MILLIGRAM(S): 150 TABLET, EXTENDED RELEASE ORAL at 09:07

## 2022-03-11 RX ADMIN — PANTOPRAZOLE SODIUM 40 MILLIGRAM(S): 20 TABLET, DELAYED RELEASE ORAL at 09:05

## 2022-03-11 RX ADMIN — Medication 81 MILLIGRAM(S): at 09:05

## 2022-03-11 RX ADMIN — Medication 650 MILLIGRAM(S): at 10:53

## 2022-03-11 NOTE — PROGRESS NOTE ADULT - ASSESSMENT
62 yr old F with PMHx of HTN, HLD, and depression presented to ED on 3/10/22 at 11:47 am with c/o left arm paresthesias and left facial paresthesias as well as dizziness, LKN was at 2 pm on 3/9.    CTH showed no acute infarct or hemorrhage, CTA head and neck showed no LVO, stenosis or aneurysms, CT perfusion showed no core infarct.    IV TPA was not given because patient was out of the appropriate time window for IV alteplase, sx had lasted > 4.5 hrs, and pt's sx were minor and non disabling, NIHSS 1.      #LUE and left facial paresthesias, improving. MRI head was unremarkable for acute infarct.    - Continue baby ASA and atorvastatin d/t risk factors  - PT eval  - DVT prophylaxis  - Telemonitoring    Will sign off for now, re-consult as needed    Patient was discussed with Dr. Alegre

## 2022-03-11 NOTE — PROGRESS NOTE ADULT - SUBJECTIVE AND OBJECTIVE BOX
Patient states facial and arm numbness has improved today. Still has some lightheadedness, but denies vertigo, HA, speech changes, visual disturbances, weakness, N/V.    MRI head is unremarkable.    ROS: As stated above, all others are negative.    Vital Signs Last 24 Hrs  T(C): 36.6 (11 Mar 2022 07:46), Max: 36.8 (10 Mar 2022 15:07)  T(F): 97.8 (11 Mar 2022 07:46), Max: 98.2 (10 Mar 2022 15:07)  HR: 69 (11 Mar 2022 07:46) (68 - 81)  BP: 104/48 (11 Mar 2022 03:45) (100/64 - 159/99)  BP(mean): --  RR: 18 (11 Mar 2022 07:46) (17 - 20)  SpO2: 99% (11 Mar 2022 07:46) (99% - 100%)    MEDICATIONS  (STANDING):  amLODIPine   Tablet 5 milliGRAM(s) Oral daily  aspirin enteric coated 81 milliGRAM(s) Oral daily  atorvastatin 80 milliGRAM(s) Oral at bedtime  buPROPion XL (24-Hour) . 300 milliGRAM(s) Oral daily  enoxaparin Injectable 40 milliGRAM(s) SubCutaneous every 24 hours  fluticasone propionate 50 MICROgram(s)/spray Nasal Spray 1 Spray(s) Both Nostrils every 12 hours  losartan 50 milliGRAM(s) Oral daily  pantoprazole    Tablet 40 milliGRAM(s) Oral before breakfast    MEDICATIONS  (PRN):  acetaminophen     Tablet .. 650 milliGRAM(s) Oral every 6 hours PRN Temp greater or equal to 38C (100.4F), Mild Pain (1 - 3)  meclizine 25 milliGRAM(s) Oral every 6 hours PRN Dizziness  methylphenidate ER (CONCERTA) 36 milliGRAM(s) Oral daily PRN mood  ondansetron   Disintegrating Tablet 4 milliGRAM(s) Oral every 6 hours PRN Nausea and/or Vomiting      PHYSICAL EXAM:    Gen: NAD, normocephalic  Neck: Supple  Musculoskeletal: no joint swelling/tenderness  Skin: No rashes    Neurological Exam:  HF: A x O x 3, appropriately interactive, normal affect, speech fluent, no aphasia or paraphasic errors. Naming /repetition intact   CN: PERRL, EOMI, VFF, facial sensation normal, no NLFD, tongue midline  Motor: No pronator drift, Strength 5/5 in all 4 ext, normal bulk and tone, no tremor, rigidity or bradykinesia  Sens: Intact to light touch throughout   Reflexes: Symmetric and normal, downgoing toes b/l  Coord:  No FNFA, dysmetria  Gait/Balance: Cannot test    NIHSS- 0    RESULT SUMMARY:  0 points  NIH Stroke Scale    INPUTS:  1A: Level of consciousness —> 0 = Alert; keenly responsive  1B: Ask month and age —> 0 = Both questions right  1C: 'Blink eyes' & 'squeeze hands' —> 0 = Performs both tasks  2: Horizontal extraocular movements —> 0 = Normal  3: Visual fields —> 0 = No visual loss  4: Facial palsy —> 0 = Normal symmetry  5A: Left arm motor drift —> 0 = No drift for 10 seconds  5B: Right arm motor drift —> 0 = No drift for 10 seconds  6A: Left leg motor drift —> 0 = No drift for 5 seconds  6B: Right leg motor drift —> 0 = No drift for 5 seconds  7: Limb Ataxia —> 0 = No ataxia  8: Sensation —> 0= Sensation intact  9: Language/aphasia —> 0 = Normal; no aphasia  10: Dysarthria —> 0 = Normal  11: Extinction/inattention —> 0 = No abnormality    RESULT SUMMARY:  0 points  Modified [place] Scale    INPUTS:  Patient's Baseline Activity —> 0 = No symptoms at all    LABS:                         13.2   6.69  )-----------( 259      ( 11 Mar 2022 07:49 )             40.9     03-11    140  |  107  |  14  ----------------------------<  98  3.9   |  29  |  0.82    Ca    9.2      11 Mar 2022 07:49    TPro  7.6  /  Alb  3.8  /  TBili  0.3  /  DBili  x   /  AST  33  /  ALT  40  /  AlkPhos  70  03-10    LIVER FUNCTIONS - ( 10 Mar 2022 12:00 )  Alb: 3.8 g/dL / Pro: 7.6 gm/dL / ALK PHOS: 70 U/L / ALT: 40 U/L / AST: 33 U/L / GGT: x           RADIOLOGY:   MR Head No Cont (03.10.22 @ 18:48) >    IMPRESSION:    Unremarkable exam.    CT Head, CTP, CT Angio Head/Neck w/ IV Cont (03.10.22 @ 12:04) >  IMPRESSION:    HEAD CT: No acute intracranial hemorrhage or acute territorial infarction.    CT PERFUSION demonstrated: No asymmetric or territorial perfusion   abnormality.    If symptoms persist consider follow up head CT or MRI, MRA  if no   contraindication.    CTA COW:  Patent intracranial circulation without flow limiting stenosis   or large vessel occlusion.    CTA NECK: Patent, ECAs, ICAs, no  hemodynamically significant stenosis at    ICA origins by NASCET criteria.  Bilateral vertebral arteries are patent without flow limiting stenosis.         Patient states facial and arm numbness has improved today. Still has some lightheadedness, but denies vertigo, speech changes, visual disturbances, weakness, N/V.  She does report some mild headache.    MRI head is unremarkable.    ROS: As stated above, all others are negative.    Vital Signs Last 24 Hrs  T(C): 36.6 (11 Mar 2022 07:46), Max: 36.8 (10 Mar 2022 15:07)  T(F): 97.8 (11 Mar 2022 07:46), Max: 98.2 (10 Mar 2022 15:07)  HR: 69 (11 Mar 2022 07:46) (68 - 81)  BP: 104/48 (11 Mar 2022 03:45) (100/64 - 159/99)  BP(mean): --  RR: 18 (11 Mar 2022 07:46) (17 - 20)  SpO2: 99% (11 Mar 2022 07:46) (99% - 100%)    MEDICATIONS  (STANDING):  amLODIPine   Tablet 5 milliGRAM(s) Oral daily  aspirin enteric coated 81 milliGRAM(s) Oral daily  atorvastatin 80 milliGRAM(s) Oral at bedtime  buPROPion XL (24-Hour) . 300 milliGRAM(s) Oral daily  enoxaparin Injectable 40 milliGRAM(s) SubCutaneous every 24 hours  fluticasone propionate 50 MICROgram(s)/spray Nasal Spray 1 Spray(s) Both Nostrils every 12 hours  losartan 50 milliGRAM(s) Oral daily  pantoprazole    Tablet 40 milliGRAM(s) Oral before breakfast    MEDICATIONS  (PRN):  acetaminophen     Tablet .. 650 milliGRAM(s) Oral every 6 hours PRN Temp greater or equal to 38C (100.4F), Mild Pain (1 - 3)  meclizine 25 milliGRAM(s) Oral every 6 hours PRN Dizziness  methylphenidate ER (CONCERTA) 36 milliGRAM(s) Oral daily PRN mood  ondansetron   Disintegrating Tablet 4 milliGRAM(s) Oral every 6 hours PRN Nausea and/or Vomiting      PHYSICAL EXAM:    Gen: NAD, normocephalic  Neck: Supple  Musculoskeletal: no joint swelling/tenderness  Skin: No rashes    Neurological Exam:  HF: A x O x 3, appropriately interactive, normal affect, speech fluent, no aphasia or paraphasic errors. Naming /repetition intact   CN: PERRL, EOMI, VFF, facial sensation normal, no NLFD, tongue midline  Motor: No pronator drift, Strength 5/5 in all 4 ext, normal bulk and tone, no tremor, rigidity or bradykinesia  Sens: Intact to light touch throughout   Reflexes: Symmetric and normal, downgoing toes b/l  Coord:  No FNFA, dysmetria  Gait/Balance: Cannot test    NIHSS- 0    RESULT SUMMARY:  0 points  NIH Stroke Scale    INPUTS:  1A: Level of consciousness —> 0 = Alert; keenly responsive  1B: Ask month and age —> 0 = Both questions right  1C: 'Blink eyes' & 'squeeze hands' —> 0 = Performs both tasks  2: Horizontal extraocular movements —> 0 = Normal  3: Visual fields —> 0 = No visual loss  4: Facial palsy —> 0 = Normal symmetry  5A: Left arm motor drift —> 0 = No drift for 10 seconds  5B: Right arm motor drift —> 0 = No drift for 10 seconds  6A: Left leg motor drift —> 0 = No drift for 5 seconds  6B: Right leg motor drift —> 0 = No drift for 5 seconds  7: Limb Ataxia —> 0 = No ataxia  8: Sensation —> 0= Sensation intact  9: Language/aphasia —> 0 = Normal; no aphasia  10: Dysarthria —> 0 = Normal  11: Extinction/inattention —> 0 = No abnormality    RESULT SUMMARY:  0 points  Modified [place] Scale    INPUTS:  Patient's Baseline Activity —> 0 = No symptoms at all    LABS:                         13.2   6.69  )-----------( 259      ( 11 Mar 2022 07:49 )             40.9     03-11    140  |  107  |  14  ----------------------------<  98  3.9   |  29  |  0.82    Ca    9.2      11 Mar 2022 07:49    TPro  7.6  /  Alb  3.8  /  TBili  0.3  /  DBili  x   /  AST  33  /  ALT  40  /  AlkPhos  70  03-10    LIVER FUNCTIONS - ( 10 Mar 2022 12:00 )  Alb: 3.8 g/dL / Pro: 7.6 gm/dL / ALK PHOS: 70 U/L / ALT: 40 U/L / AST: 33 U/L / GGT: x           RADIOLOGY:   MR Head No Cont (03.10.22 @ 18:48) >    IMPRESSION:    Unremarkable exam.    CT Head, CTP, CT Angio Head/Neck w/ IV Cont (03.10.22 @ 12:04) >  IMPRESSION:    HEAD CT: No acute intracranial hemorrhage or acute territorial infarction.    CT PERFUSION demonstrated: No asymmetric or territorial perfusion   abnormality.    If symptoms persist consider follow up head CT or MRI, MRA  if no   contraindication.    CTA COW:  Patent intracranial circulation without flow limiting stenosis   or large vessel occlusion.    CTA NECK: Patent, ECAs, ICAs, no  hemodynamically significant stenosis at    ICA origins by NASCET criteria.  Bilateral vertebral arteries are patent without flow limiting stenosis.

## 2022-03-11 NOTE — DISCHARGE NOTE PROVIDER - HOSPITAL COURSE
Reason for Admission: LUE numbness/left facial numbness/dizziness  History of Present Illness:   62 y.o. female with PMHx of HTN/HLD/Depression p/w sudden onset of severe dizziness with inability to ambulate and severe nausea day prior improved with dramamine followed by LUE/Left facial numbness this am - now is better. Pt also reported some lower subxyphoid chest sensation - resolved now.    Hospital course:  Pt ruled out for acute CVA.  CT head, CTA head/neck, MRI brain all unremarkable.  Pt feeling better, denies any complaints that brought her in.  On tele has some ectopy, PVCs but nothing new.  Pt cleared by neurology and cardiology.  She was recommended daily aspirin and to continue home meds.  NO fever, chills, n, v.  SPoke to pt and  at bedside concerning diagnosis, possible TIA vs migraine, vestibulopathy.    REVIEW OF SYSTEMS: All other review of systems is negative unless indicated above.    Vital Signs Last 24 Hrs  T(C): 36.6 (11 Mar 2022 07:46), Max: 36.8 (10 Mar 2022 15:07)  T(F): 97.8 (11 Mar 2022 07:46), Max: 98.2 (10 Mar 2022 15:07)  HR: 69 (11 Mar 2022 07:46) (68 - 72)  BP: 104/48 (11 Mar 2022 03:45) (100/64 - 150/69)  BP(mean): --  RR: 18 (11 Mar 2022 07:46) (17 - 20)  SpO2: 99% (11 Mar 2022 07:46) (99% - 100%)    PHYSICAL EXAM:    Constitutional: NAD, awake and alert, well-developed  HEENT: PERR, EOMI, Normal Hearing, MMM  Neck: Soft and supple  Respiratory: Breath sounds are clear bilaterally, No wheezing, rales or rhonchi  Cardiovascular: S1 and S2, regular rate and rhythm, no Murmurs, gallops or rubs  Gastrointestinal: Bowel Sounds present, soft, nontender, nondistended, no guarding, no rebound  Extremities: No peripheral edema  Neurological: A/O x 3, no focal deficits in my limited exam    med/labs: Reviewed and interpreted     Assessment and Plan:     Dizziness/vertigo + LUE/Left facial numbness and chest sensation:  -Probable migraine or peripheral vestibulopathy  -CVA ruled out  -CT and MRI negative.  -no significant abnormalities in vitals or blood work.  -recommend daily baby aspirin if event was possible TIA.   -cleared by neuro and cardiology for discharge.    3. HTN - cont current meds    4. Depression on wellbutrin    5. VTE proph - LMWH    Attending Statement: 40 minutes spent on total encounter and discharge planning.

## 2022-03-11 NOTE — DISCHARGE NOTE PROVIDER - NSTOBACCOUSAGEY/N_GEN_A_CS
Patient aware urine sample needed; states she will call for help when she can go to the bathroom        Ange Henley [facility]  05/02/21 1140
Patient transported to CT scan     Melisa Boast, RN  05/02/21 4428
No

## 2022-03-11 NOTE — PROGRESS NOTE ADULT - ATTENDING COMMENTS
I saw the patient and agree.  MRI brain is negative for acute stroke.  Patient's symptoms of numbness were rather prolonged for TIA but can continue aspirin 81 mg/day as a precaution.  She does have some headache and this may be a complex migraine.    Plan is for d/c today.

## 2022-03-11 NOTE — CONSULT NOTE ADULT - PROBLEM SELECTOR RECOMMENDATION 9
chronic atypical.  neg trops & ECG.  Echo w/ normal EF, no RWMA.  not ACS or angina.  No further workup, may followup w/ PCP & cardiologist.

## 2022-03-11 NOTE — DISCHARGE NOTE PROVIDER - NSDCCPCAREPLAN_GEN_ALL_CORE_FT
PRINCIPAL DISCHARGE DIAGNOSIS  Diagnosis: Vertigo  Assessment and Plan of Treatment: Etiology possibly related to  migraine, possible TIA or peripheral vestibulopathy.   Stroke Ruled out.  CT and MRI normal.  No significant abnormalities found.

## 2022-03-11 NOTE — CONSULT NOTE ADULT - PROBLEM SELECTOR RECOMMENDATION 2
Up to 20 PVCs per hr.  Pt states she has a hx of frequent PVCs.  Up to 20% burden on holter.  Seen by EP.  Does not tolerate BB.  EP is monitoring for now.  Cont. to follow as OP.    Ok to d/c followup w/ her cardiologist.

## 2022-03-11 NOTE — CONSULT NOTE ADULT - SUBJECTIVE AND OBJECTIVE BOX
62 y.o. female with PMHx of HTN/HLD/Depression p/w sudden onset of severe dizziness with inability to ambulate and severe nausea day prior improved with dramamine followed by LUE/Left facial numbness this am - now is better. Pt also reported some lower subxyphoid chest sensation - resolved now. (10 Mar 2022 15:06)    admitted for dizziness, vertigo. CVA ruled w/ brain imaging.  Cardiology consulted for atypical chest discomfort & PVCs   on monitor.  Pt reports atypical chest discomfort x 2 yrs not related to exertion.  etiology unclear but saw cardiology Dr. Travis & had neg stress test.  No change in frequency of pattern of discomfort.  Not related to eating, inspiration position, etc.  No reason for coming to ED.      PAST MEDICAL AND SURGICAL HISTORY:  PAST MEDICAL & SURGICAL HISTORY:  HTN (hypertension)    H/O hyperlipidemia    Anxiety and depression    History of cholecystectomy    History of appendectomy        ALLERGIES:  Allergies    Demerol HCl (Unknown)    Intolerances        SOCIAL HISTORY:  Social History:  no tobacco, occasional ETOH, no IVDA  occasional THC gummies for sleep  works from home (10 Mar 2022 15:06)      FAMILY  HISTORY:  FAMILY HISTORY:  FH: CAD (coronary artery disease) (Father)        MEDICATIONS:  OUTPATIENT:  Home Medications:  amLODIPine 5 mg oral tablet: 1 tab(s) orally once a day (10 Mar 2022 13:52)  aspirin 81 mg oral delayed release tablet: 1 tab(s) orally once a day (11 Mar 2022 12:47)  atorvastatin 20 mg oral tablet: 1 tab(s) orally once a day (10 Mar 2022 13:52)  Concerta 36 mg/24 hr oral tablet, extended release: 1 tab(s) orally once a day (in the morning), As Needed (10 Mar 2022 13:52)  Flonase 50 mcg/inh nasal spray: 1 spray(s) in each nostril once a day, As Needed (10 Mar 2022 13:52)  olmesartan 20 mg oral tablet: 1 tab(s) orally once a day (10 Mar 2022 13:52)  Vitamin D3 25 mcg (1000 intl units) oral tablet: 1 tab(s) orally once a day (10 Mar 2022 13:52)  Wellbutrin  mg/24 hours oral tablet, extended release: 1 tab(s) orally every 24 hours (10 Mar 2022 13:52)      INPATIENT:  MEDICATIONS  (STANDING):  amLODIPine   Tablet 5 milliGRAM(s) Oral daily  aspirin enteric coated 81 milliGRAM(s) Oral daily  atorvastatin 80 milliGRAM(s) Oral at bedtime  buPROPion XL (24-Hour) . 300 milliGRAM(s) Oral daily  enoxaparin Injectable 40 milliGRAM(s) SubCutaneous every 24 hours  fluticasone propionate 50 MICROgram(s)/spray Nasal Spray 1 Spray(s) Both Nostrils every 12 hours  losartan 50 milliGRAM(s) Oral daily  pantoprazole    Tablet 40 milliGRAM(s) Oral before breakfast    MEDICATIONS  (PRN):  acetaminophen     Tablet .. 650 milliGRAM(s) Oral every 6 hours PRN Temp greater or equal to 38C (100.4F), Mild Pain (1 - 3)  meclizine 25 milliGRAM(s) Oral every 6 hours PRN Dizziness  methylphenidate ER (CONCERTA) 36 milliGRAM(s) Oral daily PRN mood  ondansetron   Disintegrating Tablet 4 milliGRAM(s) Oral every 6 hours PRN Nausea and/or Vomiting    MEDICATIONS  (PRN):  acetaminophen     Tablet .. 650 milliGRAM(s) Oral every 6 hours PRN Temp greater or equal to 38C (100.4F), Mild Pain (1 - 3)  meclizine 25 milliGRAM(s) Oral every 6 hours PRN Dizziness  methylphenidate ER (CONCERTA) 36 milliGRAM(s) Oral daily PRN mood  ondansetron   Disintegrating Tablet 4 milliGRAM(s) Oral every 6 hours PRN Nausea and/or Vomiting    REVIEW OF SYSTEMS:  ===============================  ===============================  CONSTITUTIONAL: No weakness, fevers or chills  EYES/ENT: No visual changes;  No vertigo or throat pain   NECK: No pain or stiffness  RESPIRATORY: No cough, wheezing, hemoptysis; No shortness of breath  CARDIOVASCULAR: No chest pain or palpitations  GASTROINTESTINAL: No abdominal or epigastric pain. No nausea, vomiting, or hematemesis;   No diarrhea or constipation. No melena or hematochezia.  GENITOURINARY: No dysuria, frequency or hematuria  NEUROLOGICAL: No numbness or weakness  SKIN: No itching, burning, rashes, or lesions   All other review of systems is negative unless indicated above    Vital Signs Last 24 Hrs  T(C): 36.6 (11 Mar 2022 07:46), Max: 36.6 (11 Mar 2022 07:46)  T(F): 97.8 (11 Mar 2022 07:46), Max: 97.8 (11 Mar 2022 07:46)  HR: 69 (11 Mar 2022 07:46) (69 - 72)  BP: 110/50 (11 Mar 2022 07:46) (100/64 - 126/77)  BP(mean): --  RR: 18 (11 Mar 2022 07:46) (18 - 20)  SpO2: 99% (11 Mar 2022 07:46) (99% - 100%)    I&O's Summary    11 Mar 2022 07:01  -  11 Mar 2022 16:30  --------------------------------------------------------  IN: 720 mL / OUT: 0 mL / NET: 720 mL        I&O's Detail    11 Mar 2022 07:01  -  11 Mar 2022 16:30  --------------------------------------------------------  IN:    Oral Fluid: 720 mL  Total IN: 720 mL    OUT:  Total OUT: 0 mL    Total NET: 720 mL          PHYSICAL EXAM:    Constitutional: NAD, awake and alert, well-developed  HEENT: PERR, EOMI,  No oral cyananosis.  Neck:  supple,  No JVD  Respiratory: Breath sounds are clear bilaterally, No wheezing, rales or rhonchi  Cardiovascular: S1 and S2, regular rate and rhythm, no Murmurs, gallops or rubs  Gastrointestinal: Bowel Sounds present, soft, nontender.   Extremities: No peripheral edema. No clubbing or cyanosis.  Vascular: 2+ peripheral pulses  Neurological: A/O x 3, no focal deficits  Musculoskeletal: no calf tenderness.  Skin: No rashes.    ===============================  ===============================  LABS:                         13.2   6.69  )-----------( 259      ( 11 Mar 2022 07:49 )             40.9                         13.7   6.82  )-----------( 279      ( 10 Mar 2022 12:00 )             43.6     11 Mar 2022 07:49    140    |  107    |  14     ----------------------------<  98     3.9     |  29     |  0.82   10 Mar 2022 12:00    142    |  108    |  13     ----------------------------<  100    3.8     |  29     |  0.90     Ca    9.2        11 Mar 2022 07:49  Ca    9.2        10 Mar 2022 12:00    TPro  7.6    /  Alb  3.8    /  TBili  0.3    /  DBili  x      /  AST  33     /  ALT  40     /  AlkPhos  70     10 Mar 2022 12:00    PT/INR - ( 10 Mar 2022 12:00 )   PT: 11.4 sec;   INR: 0.98 ratio         PTT - ( 10 Mar 2022 12:00 )  PTT:30.0 sec    THYROID STUDIES:    ===============================  ===============================  CARDIAC BIOMARKERS:  -BNP VALUES:      -TROPONIN VALUES:  -------  lab item   Troponin I, High Sensitivity Result: 13.66 ng/L (03-10-22 @ 19:21)  Troponin I, High Sensitivity Result: 15.19 ng/L (03-10-22 @ 16:07)  Troponin I, High Sensitivity Result: 6.62 ng/L (03-10-22 @ 12:00)    EKG: NSR no ischemic changes.    ===============================  ECHO:  normal EF no RWMA.  ===============================      Petey Yen M.D.  Cardiology, [facility]  Cell:   Office:   [phone] ([facility] Office)  [phone] ([facility] Office)      =================

## 2022-03-11 NOTE — DISCHARGE NOTE PROVIDER - NSDCMRMEDTOKEN_GEN_ALL_CORE_FT
amLODIPine 5 mg oral tablet: 1 tab(s) orally once a day  aspirin 81 mg oral delayed release tablet: 1 tab(s) orally once a day  atorvastatin 20 mg oral tablet: 1 tab(s) orally once a day  Concerta 36 mg/24 hr oral tablet, extended release: 1 tab(s) orally once a day (in the morning), As Needed  Flonase 50 mcg/inh nasal spray: 1 spray(s) in each nostril once a day, As Needed  olmesartan 20 mg oral tablet: 1 tab(s) orally once a day  Vitamin D3 25 mcg (1000 intl units) oral tablet: 1 tab(s) orally once a day  Wellbutrin  mg/24 hours oral tablet, extended release: 1 tab(s) orally every 24 hours

## 2022-03-14 ENCOUNTER — TRANSCRIPTION ENCOUNTER (OUTPATIENT)
Age: 62
End: 2022-03-14

## 2022-03-17 DIAGNOSIS — H81.20 VESTIBULAR NEURONITIS, UNSPECIFIED EAR: ICD-10-CM

## 2022-03-17 DIAGNOSIS — G56.92 UNSPECIFIED MONONEUROPATHY OF LEFT UPPER LIMB: ICD-10-CM

## 2022-03-17 DIAGNOSIS — G43.909 MIGRAINE, UNSPECIFIED, NOT INTRACTABLE, WITHOUT STATUS MIGRAINOSUS: ICD-10-CM

## 2022-03-17 DIAGNOSIS — E78.5 HYPERLIPIDEMIA, UNSPECIFIED: ICD-10-CM

## 2022-03-17 DIAGNOSIS — G45.9 TRANSIENT CEREBRAL ISCHEMIC ATTACK, UNSPECIFIED: ICD-10-CM

## 2022-03-17 DIAGNOSIS — I10 ESSENTIAL (PRIMARY) HYPERTENSION: ICD-10-CM

## 2022-03-17 DIAGNOSIS — R29.898 OTHER SYMPTOMS AND SIGNS INVOLVING THE MUSCULOSKELETAL SYSTEM: ICD-10-CM

## 2022-03-17 DIAGNOSIS — F32.A DEPRESSION, UNSPECIFIED: ICD-10-CM

## 2022-03-17 DIAGNOSIS — Z79.82 LONG TERM (CURRENT) USE OF ASPIRIN: ICD-10-CM

## 2022-03-17 DIAGNOSIS — Z20.822 CONTACT WITH AND (SUSPECTED) EXPOSURE TO COVID-19: ICD-10-CM

## 2022-03-17 DIAGNOSIS — R11.0 NAUSEA: ICD-10-CM

## 2022-03-17 DIAGNOSIS — R20.2 PARESTHESIA OF SKIN: ICD-10-CM

## 2022-08-27 ENCOUNTER — EMERGENCY (EMERGENCY)
Facility: HOSPITAL | Age: 62
LOS: 0 days | Discharge: HOME | End: 2022-08-27
Attending: EMERGENCY MEDICINE
Payer: COMMERCIAL

## 2022-08-27 VITALS
OXYGEN SATURATION: 96 % | DIASTOLIC BLOOD PRESSURE: 74 MMHG | TEMPERATURE: 98 F | HEART RATE: 68 BPM | SYSTOLIC BLOOD PRESSURE: 118 MMHG | RESPIRATION RATE: 17 BRPM

## 2022-08-27 VITALS — WEIGHT: 169.98 LBS

## 2022-08-27 DIAGNOSIS — Z86.73 PERSONAL HISTORY OF TRANSIENT ISCHEMIC ATTACK (TIA), AND CEREBRAL INFARCTION WITHOUT RESIDUAL DEFICITS: ICD-10-CM

## 2022-08-27 DIAGNOSIS — M25.562 PAIN IN LEFT KNEE: ICD-10-CM
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
## 2022-08-27 DIAGNOSIS — Z88.5 ALLERGY STATUS TO NARCOTIC AGENT: ICD-10-CM

## 2022-08-27 DIAGNOSIS — M25.561 PAIN IN RIGHT KNEE: ICD-10-CM

## 2022-08-27 DIAGNOSIS — E78.5 HYPERLIPIDEMIA, UNSPECIFIED: ICD-10-CM

## 2022-08-27 DIAGNOSIS — C50.919 MALIGNANT NEOPLASM OF UNSPECIFIED SITE OF UNSPECIFIED FEMALE BREAST: Chronic | ICD-10-CM

## 2022-08-27 DIAGNOSIS — Z85.3 PERSONAL HISTORY OF MALIGNANT NEOPLASM OF BREAST: ICD-10-CM

## 2022-08-27 DIAGNOSIS — F41.8 OTHER SPECIFIED ANXIETY DISORDERS: ICD-10-CM

## 2022-08-27 DIAGNOSIS — Z90.49 ACQUIRED ABSENCE OF OTHER SPECIFIED PARTS OF DIGESTIVE TRACT: Chronic | ICD-10-CM

## 2022-08-27 DIAGNOSIS — R11.0 NAUSEA: ICD-10-CM

## 2022-08-27 DIAGNOSIS — M25.571 PAIN IN RIGHT ANKLE AND JOINTS OF RIGHT FOOT: ICD-10-CM

## 2022-08-27 DIAGNOSIS — Z79.82 LONG TERM (CURRENT) USE OF ASPIRIN: ICD-10-CM

## 2022-08-27 DIAGNOSIS — Z90.49 ACQUIRED ABSENCE OF OTHER SPECIFIED PARTS OF DIGESTIVE TRACT: ICD-10-CM

## 2022-08-27 DIAGNOSIS — R53.1 WEAKNESS: ICD-10-CM

## 2022-08-27 DIAGNOSIS — Z98.890 OTHER SPECIFIED POSTPROCEDURAL STATES: Chronic | ICD-10-CM

## 2022-08-27 DIAGNOSIS — R50.9 FEVER, UNSPECIFIED: ICD-10-CM

## 2022-08-27 DIAGNOSIS — I10 ESSENTIAL (PRIMARY) HYPERTENSION: ICD-10-CM

## 2022-08-27 DIAGNOSIS — Z20.822 CONTACT WITH AND (SUSPECTED) EXPOSURE TO COVID-19: ICD-10-CM
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
## 2022-08-27 PROBLEM — F41.9 ANXIETY DISORDER, UNSPECIFIED: Chronic | Status: ACTIVE | Noted: 2022-03-10

## 2022-08-27 PROBLEM — Z86.39 PERSONAL HISTORY OF OTHER ENDOCRINE, NUTRITIONAL AND METABOLIC DISEASE: Chronic | Status: ACTIVE | Noted: 2022-03-10

## 2022-08-27 LAB
ALBUMIN SERPL ELPH-MCNC: 2.9 G/DL — LOW (ref 3.3–5)
ALP SERPL-CCNC: 161 U/L — HIGH (ref 40–120)
ALT FLD-CCNC: 40 U/L — SIGNIFICANT CHANGE UP (ref 12–78)
ANION GAP SERPL CALC-SCNC: 5 MMOL/L — SIGNIFICANT CHANGE UP (ref 5–17)
APPEARANCE UR: CLEAR — SIGNIFICANT CHANGE UP
APTT BLD: 25.9 SEC — LOW (ref 27.5–35.5)
AST SERPL-CCNC: 30 U/L — SIGNIFICANT CHANGE UP (ref 15–37)
BASOPHILS # BLD AUTO: 0.04 K/UL — SIGNIFICANT CHANGE UP (ref 0–0.2)
BASOPHILS NFR BLD AUTO: 0.1 % — SIGNIFICANT CHANGE UP (ref 0–2)
BILIRUB SERPL-MCNC: 0.3 MG/DL — SIGNIFICANT CHANGE UP (ref 0.2–1.2)
BILIRUB UR-MCNC: NEGATIVE — SIGNIFICANT CHANGE UP
BUN SERPL-MCNC: 17 MG/DL — SIGNIFICANT CHANGE UP (ref 7–23)
CALCIUM SERPL-MCNC: 8.7 MG/DL — SIGNIFICANT CHANGE UP (ref 8.5–10.1)
CHLORIDE SERPL-SCNC: 106 MMOL/L — SIGNIFICANT CHANGE UP (ref 96–108)
CO2 SERPL-SCNC: 29 MMOL/L — SIGNIFICANT CHANGE UP (ref 22–31)
COLOR SPEC: YELLOW — SIGNIFICANT CHANGE UP
CREAT SERPL-MCNC: 0.79 MG/DL — SIGNIFICANT CHANGE UP (ref 0.5–1.3)
DIFF PNL FLD: ABNORMAL
EGFR: 83 ML/MIN/1.73M2 — SIGNIFICANT CHANGE UP
EOSINOPHIL # BLD AUTO: 0 K/UL — SIGNIFICANT CHANGE UP (ref 0–0.5)
EOSINOPHIL NFR BLD AUTO: 0 % — SIGNIFICANT CHANGE UP (ref 0–6)
GLUCOSE SERPL-MCNC: 101 MG/DL — HIGH (ref 70–99)
GLUCOSE UR QL: NEGATIVE — SIGNIFICANT CHANGE UP
HCT VFR BLD CALC: 35.2 % — SIGNIFICANT CHANGE UP (ref 34.5–45)
HGB BLD-MCNC: 12 G/DL — SIGNIFICANT CHANGE UP (ref 11.5–15.5)
IMM GRANULOCYTES NFR BLD AUTO: 18.8 % — HIGH (ref 0–1.5)
INR BLD: 1.03 RATIO — SIGNIFICANT CHANGE UP (ref 0.88–1.16)
KETONES UR-MCNC: NEGATIVE — SIGNIFICANT CHANGE UP
LACTATE SERPL-SCNC: 0.9 MMOL/L — SIGNIFICANT CHANGE UP (ref 0.7–2)
LEUKOCYTE ESTERASE UR-ACNC: NEGATIVE — SIGNIFICANT CHANGE UP
LYMPHOCYTES # BLD AUTO: 1.32 K/UL — SIGNIFICANT CHANGE UP (ref 1–3.3)
LYMPHOCYTES # BLD AUTO: 4.5 % — LOW (ref 13–44)
MCHC RBC-ENTMCNC: 31.4 PG — SIGNIFICANT CHANGE UP (ref 27–34)
MCHC RBC-ENTMCNC: 34.1 GM/DL — SIGNIFICANT CHANGE UP (ref 32–36)
MCV RBC AUTO: 92.1 FL — SIGNIFICANT CHANGE UP (ref 80–100)
MONOCYTES # BLD AUTO: 0.25 K/UL — SIGNIFICANT CHANGE UP (ref 0–0.9)
MONOCYTES NFR BLD AUTO: 0.9 % — LOW (ref 2–14)
NEUTROPHILS # BLD AUTO: 22.07 K/UL — HIGH (ref 1.8–7.4)
NEUTROPHILS NFR BLD AUTO: 75.7 % — SIGNIFICANT CHANGE UP (ref 43–77)
NITRITE UR-MCNC: NEGATIVE — SIGNIFICANT CHANGE UP
PH UR: 6 — SIGNIFICANT CHANGE UP (ref 5–8)
PLATELET # BLD AUTO: 176 K/UL — SIGNIFICANT CHANGE UP (ref 150–400)
POTASSIUM SERPL-MCNC: 3.5 MMOL/L — SIGNIFICANT CHANGE UP (ref 3.5–5.3)
POTASSIUM SERPL-SCNC: 3.5 MMOL/L — SIGNIFICANT CHANGE UP (ref 3.5–5.3)
PROT SERPL-MCNC: 6.3 GM/DL — SIGNIFICANT CHANGE UP (ref 6–8.3)
PROT UR-MCNC: NEGATIVE — SIGNIFICANT CHANGE UP
PROTHROM AB SERPL-ACNC: 12 SEC — SIGNIFICANT CHANGE UP (ref 10.5–13.4)
RAPID RVP RESULT: SIGNIFICANT CHANGE UP
RBC # BLD: 3.82 M/UL — SIGNIFICANT CHANGE UP (ref 3.8–5.2)
RBC # FLD: 13.8 % — SIGNIFICANT CHANGE UP (ref 10.3–14.5)
SARS-COV-2 RNA SPEC QL NAA+PROBE: SIGNIFICANT CHANGE UP
SODIUM SERPL-SCNC: 140 MMOL/L — SIGNIFICANT CHANGE UP (ref 135–145)
SP GR SPEC: 1.01 — SIGNIFICANT CHANGE UP (ref 1.01–1.02)
TROPONIN I, HIGH SENSITIVITY RESULT: 9.58 NG/L — SIGNIFICANT CHANGE UP
UROBILINOGEN FLD QL: NEGATIVE — SIGNIFICANT CHANGE UP
WBC # BLD: 29.18 K/UL — HIGH (ref 3.8–10.5)
WBC # FLD AUTO: 29.18 K/UL — HIGH (ref 3.8–10.5)

## 2022-08-27 PROCEDURE — 0225U NFCT DS DNA&RNA 21 SARSCOV2: CPT

## 2022-08-27 PROCEDURE — 85025 COMPLETE CBC W/AUTO DIFF WBC: CPT

## 2022-08-27 PROCEDURE — 84484 ASSAY OF TROPONIN QUANT: CPT

## 2022-08-27 PROCEDURE — 99285 EMERGENCY DEPT VISIT HI MDM: CPT | Mod: 25

## 2022-08-27 PROCEDURE — 96374 THER/PROPH/DIAG INJ IV PUSH: CPT

## 2022-08-27 PROCEDURE — 93010 ELECTROCARDIOGRAM REPORT: CPT

## 2022-08-27 PROCEDURE — 85610 PROTHROMBIN TIME: CPT

## 2022-08-27 PROCEDURE — 80053 COMPREHEN METABOLIC PANEL: CPT

## 2022-08-27 PROCEDURE — 81001 URINALYSIS AUTO W/SCOPE: CPT

## 2022-08-27 PROCEDURE — 85730 THROMBOPLASTIN TIME PARTIAL: CPT

## 2022-08-27 PROCEDURE — 99285 EMERGENCY DEPT VISIT HI MDM: CPT

## 2022-08-27 PROCEDURE — 83605 ASSAY OF LACTIC ACID: CPT

## 2022-08-27 PROCEDURE — 96375 TX/PRO/DX INJ NEW DRUG ADDON: CPT

## 2022-08-27 PROCEDURE — 93970 EXTREMITY STUDY: CPT | Mod: 26

## 2022-08-27 PROCEDURE — 87040 BLOOD CULTURE FOR BACTERIA: CPT

## 2022-08-27 PROCEDURE — 71045 X-RAY EXAM CHEST 1 VIEW: CPT

## 2022-08-27 PROCEDURE — 36415 COLL VENOUS BLD VENIPUNCTURE: CPT

## 2022-08-27 PROCEDURE — 70450 CT HEAD/BRAIN W/O DYE: CPT | Mod: 26,MA

## 2022-08-27 PROCEDURE — 93970 EXTREMITY STUDY: CPT

## 2022-08-27 PROCEDURE — 71045 X-RAY EXAM CHEST 1 VIEW: CPT | Mod: 26

## 2022-08-27 PROCEDURE — 70450 CT HEAD/BRAIN W/O DYE: CPT | Mod: MA

## 2022-08-27 PROCEDURE — 93005 ELECTROCARDIOGRAM TRACING: CPT

## 2022-08-27 PROCEDURE — 87086 URINE CULTURE/COLONY COUNT: CPT

## 2022-08-27 RX ORDER — CEFEPIME 1 G/1
1000 INJECTION, POWDER, FOR SOLUTION INTRAMUSCULAR; INTRAVENOUS ONCE
Refills: 0 | Status: COMPLETED | OUTPATIENT
Start: 2022-08-27 | End: 2022-08-27

## 2022-08-27 RX ORDER — CEPHALEXIN 500 MG
1 CAPSULE ORAL
Qty: 14 | Refills: 0
Start: 2022-08-27 | End: 2022-09-02

## 2022-08-27 RX ORDER — CEFEPIME 1 G/1
1000 INJECTION, POWDER, FOR SOLUTION INTRAMUSCULAR; INTRAVENOUS ONCE
Refills: 0 | Status: DISCONTINUED | OUTPATIENT
Start: 2022-08-27 | End: 2022-08-27

## 2022-08-27 RX ORDER — SODIUM CHLORIDE 9 MG/ML
2400 INJECTION INTRAMUSCULAR; INTRAVENOUS; SUBCUTANEOUS ONCE
Refills: 0 | Status: COMPLETED | OUTPATIENT
Start: 2022-08-27 | End: 2022-08-27

## 2022-08-27 RX ORDER — VANCOMYCIN HCL 1 G
1000 VIAL (EA) INTRAVENOUS ONCE
Refills: 0 | Status: COMPLETED | OUTPATIENT
Start: 2022-08-27 | End: 2022-08-27

## 2022-08-27 RX ADMIN — CEFEPIME 1000 MILLIGRAM(S): 1 INJECTION, POWDER, FOR SOLUTION INTRAMUSCULAR; INTRAVENOUS at 12:23

## 2022-08-27 RX ADMIN — SODIUM CHLORIDE 2400 MILLILITER(S): 9 INJECTION INTRAMUSCULAR; INTRAVENOUS; SUBCUTANEOUS at 09:39

## 2022-08-27 RX ADMIN — Medication 250 MILLIGRAM(S): at 12:23

## 2022-08-27 NOTE — ED PROVIDER NOTE - NS ED ATTENDING STATEMENT MOD
I have seen and examined this patient and fully participated in the care of this patient as the teaching attending.  The service was shared with the MK.  I reviewed and verified the documentation and independently performed the documented:

## 2022-08-27 NOTE — ED ADULT TRIAGE NOTE - CHIEF COMPLAINT QUOTE
Pt sent in by arvin for fever on chemo for breast CA.  Pt also coincidentally had a fall c/o of leg pain.

## 2022-08-27 NOTE — ED PROVIDER NOTE - PATIENT PORTAL LINK FT
You can access the FollowMyHealth Patient Portal offered by [facility] by registering at the following website: [email or web address]. By joining [facility]’s FollowMyHealth portal, you will also be able to view your health information using other applications (apps) compatible with our system.

## 2022-08-27 NOTE — ED PROVIDER NOTE - NSICDXPASTSURGICALHX_GEN_ALL_CORE_FT
PAST SURGICAL HISTORY:  Breast cancer     H/O lumpectomy     History of appendectomy     History of cholecystectomy

## 2022-08-27 NOTE — ED PROVIDER NOTE - CLINICAL SUMMARY MEDICAL DECISION MAKING FREE TEXT BOX
labs and imaging were obtained, pt was given IVF, she was given broad spectrum abx as well, wbc is 29, she did have neurlasta on 8/25, chemo on 8/23. I had discussed with hospitalist and I d/w [facility] oncologist, the patient is feeling improved here and able to walk per her baseline. She stated she would like to go home, I discussed all results with her, discussed close f/u and strict return precautions. Her urine shows trace bacteria and will cover with keflex. She is agreeable with plan of care and dc in stable condition.

## 2022-08-27 NOTE — ED PROVIDER NOTE - OBJECTIVE STATEMENT
62F with PMH of TIA in March 2022, breast C/A s/p lumpectomy and lymph node removal in 06/22, last chemo 4 days ago who comes in because of fall.   She says she felt left axillary pain, nausea at the time she fell last night at around 2 am. She couldn't recall exactly how it happened, but reported she felt pain in both inner knee regions and suspected right ankle sprain. She said she had a fever 99.1 yesterday night and 100 this morning.     Also endorses chills last night.   Denies headache, bleeding, chest pain, SOB, abdominal pain, dysuria, frequency, urgency, hematuria, vomiting, diarrhea.

## 2022-08-27 NOTE — ED PROVIDER NOTE - ATTENDING CONTRIBUTION TO CARE
I, Awilda Trevino MD,  performed the initial face to face bedside interview with this patient regarding history of present illness, review of symptoms and relevant past medical, social and family history.  I completed an independent physical examination.  I was the initial provider who evaluated this patient. I have signed out the follow up of any pending tests (i.e. labs, radiological studies) to the resident.  I have communicated the patient’s plan of care and disposition with the resident.  The history, relevant review of systems, past medical and surgical history, medical decision making, and physical examination was documented by the scribe in my presence and I attest to the accuracy of the documentation.

## 2022-08-27 NOTE — ED PROVIDER NOTE - NS ED ROS FT
Review of Symptoms  Gen: with fevers, and chills, no sweats  Visual: no recent changes in vision, with blurriness when chemo started, not related to fall   Cardiovascular: no chest pain, no palpitations, no orthopnea, no leg swelling  Respiratory: no shortness of breath, no exertional dyspnea, no cough, no rhinorrhea, no nasal congestion  GI: with nausea, no vomiting, no abdominal pain, no diarrhea, no constipation, no melana  : no dysuria, no increased freq, no hematuria  Derm: no wounds  MSK: with b/l inner knee pain, no joint swelling or redness, right ankle pain   Neuro: no headache, no numbness, no weakness, with memory loss

## 2022-08-27 NOTE — ED PROVIDER NOTE - PHYSICAL EXAMINATION
Vitals  T(F): 98.3 (08-27-22 @ 09:05), Max: 98.3 (08-27-22 @ 09:05)  HR: 78 (08-27-22 @ 09:05) (78 - 78)  BP: 87/56 (08-27-22 @ 09:05) (87/56 - 87/56)  RR: 17 (08-27-22 @ 09:05) (17 - 17)  SpO2: 95% (08-27-22 @ 09:05) (95% - 95%)    PHYSICAL EXAM   Gen: NAD, comfortable, AA&Ox4  HEENT: head atrumatic and normocephalic, PERRLA, EOMI  CVS: +s1, s2, regular rate and rhythm, no murmurs, rubs or gallops  RESP: normal respiratory effort, clear to auscultation b/l, no wheezes/crackles/rhonchi  GI: soft, non-tender, non-distended, +bowel sounds, no guarding or rigidity   Back: no CVA tenderness   Extremities: no pitting edema  Skin: nl warm and dry, no wounds   Neuro: answering questions appropriately, face symmetric, sensation equal bilaterally in face, tongue midline, 5/5 strength in upper and lower extremities bilaterally, sensation intact in upper and lower extremities bilaterally

## 2022-08-28 LAB
CULTURE RESULTS: SIGNIFICANT CHANGE UP
SPECIMEN SOURCE: SIGNIFICANT CHANGE UP

## 2022-09-01 LAB
CULTURE RESULTS: SIGNIFICANT CHANGE UP
CULTURE RESULTS: SIGNIFICANT CHANGE UP
SPECIMEN SOURCE: SIGNIFICANT CHANGE UP
SPECIMEN SOURCE: SIGNIFICANT CHANGE UP

## 2022-11-18 ENCOUNTER — NON-APPOINTMENT (OUTPATIENT)
Age: 62
End: 2022-11-18
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
## 2023-06-25 ENCOUNTER — NON-APPOINTMENT (OUTPATIENT)
Age: 63
End: 2023-06-25

## 2023-06-26 ENCOUNTER — APPOINTMENT (OUTPATIENT)
Dept: OTOLARYNGOLOGY | Facility: CLINIC | Age: 63
End: 2023-06-26
Payer: MEDICARE

## 2023-06-26 ENCOUNTER — NON-APPOINTMENT (OUTPATIENT)
Age: 63
End: 2023-06-26

## 2023-06-26 VITALS — HEIGHT: 66 IN | WEIGHT: 145 LBS | BODY MASS INDEX: 23.3 KG/M2

## 2023-06-26 VITALS — DIASTOLIC BLOOD PRESSURE: 80 MMHG | HEART RATE: 78 BPM | SYSTOLIC BLOOD PRESSURE: 125 MMHG

## 2023-06-26 DIAGNOSIS — H90.3 SENSORINEURAL HEARING LOSS, BILATERAL: ICD-10-CM

## 2023-06-26 DIAGNOSIS — R42 DIZZINESS AND GIDDINESS: ICD-10-CM

## 2023-06-26 DIAGNOSIS — H81.20 VESTIBULAR NEURONITIS, UNSPECIFIED EAR: ICD-10-CM

## 2023-06-26 DIAGNOSIS — H61.23 IMPACTED CERUMEN, BILATERAL: ICD-10-CM

## 2023-06-26 PROCEDURE — G0268 REMOVAL OF IMPACTED WAX MD: CPT

## 2023-06-26 PROCEDURE — 92557 COMPREHENSIVE HEARING TEST: CPT

## 2023-06-26 PROCEDURE — 99204 OFFICE O/P NEW MOD 45 MIN: CPT | Mod: 25

## 2023-06-26 PROCEDURE — 92567 TYMPANOMETRY: CPT

## 2023-06-26 NOTE — HISTORY OF PRESENT ILLNESS
[de-identified] : c/o problem with balance - ? problem x several years.  - problem intermittent x 2 years.  Last week severe spinning and lasted 4 hrs.  Munger bouncing of room.  Had vomiting.  Had second episode 2 days later.  No blackout ? and had ? blurred vision.  No episodes. since. No problems turning in bed. No Bp issues.   No change in hearing.  No prior ear issues.  Hx of cleft palate repair age 1

## 2023-06-26 NOTE — PHYSICAL EXAM
[Midline] : trachea located in midline position [Normal] : no rashes [de-identified] : bilat impacted cerumen  [de-identified] : after cerumen removal  [de-identified] : s/p  cleft palate repain

## 2023-06-26 NOTE — ASSESSMENT
[FreeTextEntry1] : Patient with intermittent problems with vertigo..  Exam today unremarkable and audio shows symmetric snhl -  recommend vng and pending result may need MRI , neuro or vestibular therapy .  Follouwup afer vng

## 2023-07-19 ENCOUNTER — NON-APPOINTMENT (OUTPATIENT)
Age: 63
End: 2023-07-19

## 2023-08-02 NOTE — DISCHARGE NOTE NURSING/CASE MANAGEMENT/SOCIAL WORK - NSFLUVACAGEDISCH_IMM_ALL_CORE
· Likely progression of dementia, no clear medical etiology   · On multiple medications that could contribute that are relatively new according to the son:  Memantine, Robaxin, Lexapro, Haldol, Depakote and Depakote which is new this admission.  · More confused on 07/28, better on 07/29--discontinued scheduled Robaxin  · Multiple ABGs with progressing respiratory alkalosis, possibly contributing.  · TSH, folate ordered 2 weeks ago at outside facility normal.  B12, ammonia normal.  MRI brain with no acute process.  EEG negative for seizure activity (although patient had a dose of Ativan about 4 hours earlier).  · Critical care consulted, appreciate assistance:  AFib RVR with hypotension on 08/01, transferred to ICU   Adult

## 2023-08-30 ENCOUNTER — APPOINTMENT (OUTPATIENT)
Dept: OTOLARYNGOLOGY | Facility: CLINIC | Age: 63
End: 2023-08-30
Payer: MEDICARE

## 2023-08-30 ENCOUNTER — NON-APPOINTMENT (OUTPATIENT)
Age: 63
End: 2023-08-30

## 2023-08-30 PROCEDURE — 92537 CALORIC VSTBLR TEST W/REC: CPT

## 2023-08-30 PROCEDURE — 92567 TYMPANOMETRY: CPT

## 2023-08-30 PROCEDURE — 92540 BASIC VESTIBULAR EVALUATION: CPT

## 2023-10-05 ENCOUNTER — NON-APPOINTMENT (OUTPATIENT)
Age: 63
End: 2023-10-05

## 2023-10-14 NOTE — PATIENT PROFILE ADULT - PRO INTERPRETER NEED 2
Patient Name: Jane Suazo  : 1959    MRN: [id number]                              Today's Date: 10/14/2023       Admit Date: 10/10/2023    Visit Dx: Therapist utilized gait belt, applied non-slipped socks, provided fall risk education/prevention, & facilitated muscle strengthening PRN to reduce patient falls risk during this session.      ICD-10-CM ICD-9-CM   1. Impaired mobility [Z74.09]  Z74.09 799.89   2. Decreased activities of daily living (ADL) [Z78.9]  Z78.9 V49.89     Patient Active Problem List   Diagnosis    Asthma, severe persistent    Paroxysmal atrial fibrillation    Essential hypertension    Obstructive sleep apnea    Allergic rhinitis    Mixed hyperlipidemia    H/O cardiac radiofrequency ablation    Chest pain, atypical    Morbid obesity with BMI of 40.0-44.9, adult    Nocturnal hypoxemia    Gastroesophageal reflux disease    Personal history of COVID-19    Chest pain    Nonischemic cardiomyopathy    Coronary artery disease involving native coronary artery of native heart without angina pectoris    Restrictive lung disease    Left ventricular systolic dysfunction without heart failure    Esophageal obstruction due to food impaction    Primary osteoarthritis of right shoulder     Past Medical History:   Diagnosis Date    Abnormal stress test     Anxiety     Arrhythmia     Arthritis     Asthma     Atrial fibrillation     Cardiomyopathy of undetermined type 2021    Class 2 severe obesity due to excess calories with serious comorbidity and body mass index (BMI) of 39.0 to 39.9 in adult 2018    Coronary artery disease involving native coronary artery of native heart without angina pectoris 2018    Diabetes mellitus     borderline    Elevated cholesterol     Hypertension     Mixed hyperlipidemia 10/02/2019    Myocardial infarction     mild in     Sleep apnea     cpap     Past Surgical History:   Procedure Laterality Date    ARM DEBRIDEMENT Left     CARDIAC ABLATION   11/28/2018    Dr. Braun     CHOLECYSTECTOMY      EYE SURGERY Bilateral     cataracts     FOREIGN BODY REMOVAL N/A 09/03/2022    Procedure: FOREIGN BODY REMOVAL;  Surgeon: Kan Chan MD;  Location: [facility] OR;  Service: Gastroenterology;  Laterality: N/A;  pre food bolus  post  Dr. Gilbert    HYSTERECTOMY      OOPHORECTOMY      ROTATOR CUFF REPAIR Bilateral     SPIDER BITE EXCISION Left 2010    groin    TOTAL SHOULDER ARTHROPLASTY W/ DISTAL CLAVICLE EXCISION Right 10/10/2023    Procedure: RIGHT REVERSE TOTAL SHOULDER ARTHROPLASTY;  Surgeon: Onofre Howard MD;  Location:  [facility] OR;  Service: Orthopedics;  Laterality: Right;    TRIGGER FINGER RELEASE Left 04/05/2019    Procedure: LEFT TRIGGER THUMB RELEASE;  Surgeon: Bart Huetra MD;  Location:  [facility] OR;  Service: Orthopedics      General Information       Row Name 10/14/23 1001          OT Time and Intention    Document Type therapy note (daily note)  -MT     Mode of Treatment occupational therapy  -MT       Row Name 10/14/23 1001          General Information    Patient Profile Reviewed yes  -MT     Existing Precautions/Restrictions brace on at all times;fall;right;shoulder  NWB RUE  -MT       Row Name 10/14/23 1001          Cognition    Orientation Status (Cognition) oriented x 4  -MT       Row Name 10/14/23 1001          Safety Issues, Functional Mobility    Impairments Affecting Function (Mobility) pain;endurance/activity tolerance;range of motion (ROM);strength  -MT               User Key  (r) = Recorded By, (t) = Taken By, (c) = Cosigned By      Initials Name Provider Type    MT Fanny Chao COTA Occupational Therapist Assistant                     Mobility/ADL's       Row Name 10/14/23 1001          Bed Mobility    Sit-Supine Hawkins (Bed Mobility) standby assist;verbal cues  -MT     Bed Mobility, Safety Issues decreased use of arms for pushing/pulling  -MT       Row Name 10/14/23 1001          Transfers    Transfers sit-stand  transfer;stand-sit transfer  -MT       Row Name 10/14/23 1001          Sit-Stand Transfer    Sit-Stand Lewis (Transfers) contact guard;verbal cues  -MT       Row Name 10/14/23 1001          Stand-Sit Transfer    Stand-Sit Lewis (Transfers) contact guard;verbal cues  -MT       [facility] Name 10/14/23 1001          Toilet Transfer    Type (Toilet Transfer) sit-stand;stand-sit  -MT     Lewis Level (Toilet Transfer) contact guard;verbal cues  -MT       Row Name 10/14/23 1001          Functional Mobility    Functional Mobility- Ind. Level standby assist  -MT     Functional Mobility- Comment short distance in room to BSC  -MT       Row Name 10/14/23 1001          Activities of Daily Living    BADL Assessment/Intervention upper body dressing  -[facility] Name 10/14/23 1001          Mobility    Extremity Weight-bearing Status right upper extremity  -MT     Right Upper Extremity (Weight-bearing Status) non weight-bearing (NWB)  -[facility] Name 10/14/23 1001          Lower Body Dressing Assessment/Training    Comment, (Lower Body Dressing) management on commode SBA  -[facility] Name 10/14/23 1001          Upper Body Dressing Assessment/Training    Comment, (Upper Body Dressing) needed ModA to doff/re don after ther ex. Educated on and had pt teach back techniques for adelita dressing focusing on pain reducation and following shoulder sx precautions. Anticipate pt will need assist with ADLs of UB  -MT               User Key  (r) = Recorded By, (t) = Taken By, (c) = Cosigned By      Initials Name Provider Type    Fanny Merino COTA Occupational Therapist Assistant                   Obj/Interventions    No documentation.                  Goals/Plan    No documentation.                  Clinical Impression       Row Name 10/14/23 1001          Pain Assessment    Pretreatment Pain Rating 10/10  -MT     Posttreatment Pain Rating 10/10  -MT     Pain Location - Side/Orientation Right  -MT     Pain Location  upper  -MT     Pain Location - extremity;head  -MT     Pre/Posttreatment Pain Comment NSG giving meds during tx  -MT     Pain Intervention(s) Medication (See MAR);Repositioned  -MT       Row Name 10/14/23 1001          Therapy Plan Review/Discharge Plan (OT)    Anticipated Discharge Disposition (OT) home with assist;home with home health  -MT       Row Name 10/14/23 1001          Positioning and Restraints    In Bed fowlers;call light within reach;encouraged to call for assist;exit alarm on;side rails up x2;with brace;RUE elevated  -MT               User Key  (r) = Recorded By, (t) = Taken By, (c) = Cosigned By      Initials Name Provider Type    MT Fanny Chao COTA Occupational Therapist Assistant                   Outcome Measures       Row Name 10/14/23 1001          How much help from another is currently needed...    Putting on and taking off regular lower body clothing? 2  -MT     Bathing (including washing, rinsing, and drying) 3  -MT     Toileting (which includes using toilet bed pan or urinal) 3  -MT     Putting on and taking off regular upper body clothing 2  -MT     Taking care of personal grooming (such as brushing teeth) 3  -MT     Eating meals 4  -MT     AM-PAC 6 Clicks Score (OT) 17  -MT       Row Name 10/14/23 0800 10/14/23 0000       How much help from another person do you currently need...    Turning from your back to your side while in flat bed without using bedrails? 3  -KS 3  -SB    Moving from lying on back to sitting on the side of a flat bed without bedrails? 3  -KS 3  -SB    Moving to and from a bed to a chair (including a wheelchair)? 3  -KS 3  -SB    Standing up from a chair using your arms (e.g., wheelchair, bedside chair)? 3  -KS 3  -SB    Climbing 3-5 steps with a railing? 3  -KS 3  -SB    To walk in hospital room? 3  -KS 3  -SB    AM-PAC 6 Clicks Score (PT) 18  -KS 18  -SB    Highest level of mobility 6 --> Walked 10 steps or more  -KS 6 --> Walked 10 steps or more  -SB               User Key  (r) = Recorded By, (t) = Taken By, (c) = Cosigned By      Initials Name Provider Type    MT Fanny Chao COTA Occupational Therapist Assistant    Keke Tovar RN Registered Nurse    Artis Salvador, RN Registered Nurse                    Occupational Therapy Education       Title: PT OT SLP Therapies (Done)       Topic: Occupational Therapy (Done)       Point: ADL training (Done)       Description:   Instruct learner(s) on proper safety adaptation and remediation techniques during self care or transfers.   Instruct in proper use of assistive devices.                  Learning Progress Summary             Patient Acceptance, E, VU by  at 10/13/2023 1335    Acceptance, E, VU by MB at 10/11/2023 0847                         Point: Home exercise program (Done)       Description:   Instruct learner(s) on appropriate technique for monitoring, assisting and/or progressing therapeutic exercises/activities.                  Learning Progress Summary             Patient Acceptance, E, VU by MB at 10/11/2023 0847                         Point: Precautions (Done)       Description:   Instruct learner(s) on prescribed precautions during self-care and functional transfers.                  Learning Progress Summary             Patient Acceptance, E, VU by  at 10/13/2023 1335    Eager, E,TB,D, VU,DU,NR by  at 10/12/2023 1545    Acceptance, E, VU by MB at 10/11/2023 0847                         Point: Body mechanics (Done)       Description:   Instruct learner(s) on proper positioning and spine alignment during self-care, functional mobility activities and/or exercises.                  Learning Progress Summary             Patient Acceptance, E,TB,D, VU,NR,DU by  at 10/13/2023 1416    Eager, E,TB,D, VU,DU,NR by  at 10/12/2023 1545    Acceptance, E, VU by MB at 10/11/2023 0847                                         User Key       Initials Effective Dates Name Provider Type Discipline    TS  English 02/03/23 -  Roberta Hernandez COTA Occupational Therapist Assistant OT    JERI 07/07/23 -  Pamela Em, RN Registered Nurse Nurse    MB 08/04/23 -  Iván Stanley OT Student OT Student OT                  OT Recommendation and Plan     Plan of Care Review  Plan of Care Reviewed With: patient  Progress: improving  Outcome Evaluation: needed ModA to doff/re don sling after R elbow, wrist and digit ther ex x10 reps x2 sets. Educated on and had pt teach back techniques for adelita dressing focusing on pain reducation and following shoulder sx precautions. Anticipate pt will need assist with ADLs of UB. Pt SBA-S for fxl sit<>stands and t/f <> BSC. Pt BTB SBA increased time, needed assist propping RUE for pain reducation. Continue OT POC, would benefit from HH/assist at d/c.     Time Calculation:         Time Calculation- OT       Row Name 10/14/23 1001             Time Calculation- OT    OT Start Time 1001  -MT      OT Stop Time 1039  -MT      OT Time Calculation (min) 38 min  -MT      Total Timed Code Minutes- OT 38 minute(s)  -MT      OT Received On 10/14/23  -MT         Timed Charges    11796 - OT Therapeutic Exercise Minutes 15  -MT      89628 - OT Self Care/Mgmt Minutes 23  -MT         Total Minutes    Timed Charges Total Minutes 38  -MT       Total Minutes 38  -MT                User Key  (r) = Recorded By, (t) = Taken By, (c) = Cosigned By      Initials Name Provider Type    Fanny Merino COTA Occupational Therapist Assistant                           YUDI Tellez  10/14/2023

## 2024-01-19 ENCOUNTER — OFFICE (OUTPATIENT)
Dept: URBAN - METROPOLITAN AREA CLINIC 12 | Facility: CLINIC | Age: 64
Setting detail: OPHTHALMOLOGY
End: 2024-01-19
Payer: MEDICARE

## 2024-01-19 DIAGNOSIS — H47.323: ICD-10-CM

## 2024-01-19 DIAGNOSIS — D31.31: ICD-10-CM

## 2024-01-19 DIAGNOSIS — H25.13: ICD-10-CM

## 2024-01-19 DIAGNOSIS — H43.393: ICD-10-CM

## 2024-01-19 PROBLEM — H52.7 REFRACTIVE ERROR: Status: ACTIVE | Noted: 2024-01-19

## 2024-01-19 PROCEDURE — 99204 OFFICE O/P NEW MOD 45 MIN: CPT | Performed by: OPHTHALMOLOGY

## 2024-01-19 PROCEDURE — 92250 FUNDUS PHOTOGRAPHY W/I&R: CPT | Performed by: OPHTHALMOLOGY

## 2024-01-19 ASSESSMENT — REFRACTION_CURRENTRX
OS_VPRISM_DIRECTION: PROGS
OD_OVR_VA: 20/
OS_ADD: +2.25
OD_ADD: +2.25
OD_CYLINDER: -0.75
OS_AXIS: 109
OD_VPRISM_DIRECTION: PROGS
OS_CYLINDER: -0.75
OD_AXIS: 61
OS_SPHERE: +1.75
OS_OVR_VA: 20/
OD_SPHERE: +1.50

## 2024-01-19 ASSESSMENT — REFRACTION_AUTOREFRACTION
OD_AXIS: 59
OS_SPHERE: +2.25
OD_CYLINDER: -0.75
OS_AXIS: 123
OS_CYLINDER: -0.75
OD_SPHERE: +2.00

## 2024-01-19 ASSESSMENT — REFRACTION_MANIFEST
OD_CYLINDER: -0.75
OD_VA1: 20/30-1
OS_AXIS: 123
OD_AXIS: 59
OS_SPHERE: +2.25
OS_CYLINDER: -0.75
OD_SPHERE: +2.00

## 2024-01-19 ASSESSMENT — SPHEQUIV_DERIVED
OS_SPHEQUIV: 1.875
OD_SPHEQUIV: 1.625
OS_SPHEQUIV: 1.875
OD_SPHEQUIV: 1.625

## 2024-01-19 ASSESSMENT — CONFRONTATIONAL VISUAL FIELD TEST (CVF)
OD_FINDINGS: FULL
OS_FINDINGS: FULL

## 2024-03-18 ENCOUNTER — NON-APPOINTMENT (OUTPATIENT)
Age: 64
End: 2024-03-18

## 2024-03-18 ENCOUNTER — APPOINTMENT (OUTPATIENT)
Dept: INTERNAL MEDICINE | Facility: CLINIC | Age: 64
End: 2024-03-18
Payer: MEDICARE

## 2024-03-18 VITALS
HEIGHT: 66 IN | DIASTOLIC BLOOD PRESSURE: 74 MMHG | BODY MASS INDEX: 23.14 KG/M2 | TEMPERATURE: 98 F | SYSTOLIC BLOOD PRESSURE: 128 MMHG | HEART RATE: 77 BPM | WEIGHT: 144 LBS | OXYGEN SATURATION: 99 %

## 2024-03-18 DIAGNOSIS — Z82.49 FAMILY HISTORY OF ISCHEMIC HEART DISEASE AND OTHER DISEASES OF THE CIRCULATORY SYSTEM: ICD-10-CM

## 2024-03-18 DIAGNOSIS — Z83.49 FAMILY HISTORY OF OTHER ENDOCRINE, NUTRITIONAL AND METABOLIC DISEASES: ICD-10-CM

## 2024-03-18 DIAGNOSIS — E04.1 NONTOXIC SINGLE THYROID NODULE: ICD-10-CM

## 2024-03-18 DIAGNOSIS — R31.9 HEMATURIA, UNSPECIFIED: ICD-10-CM

## 2024-03-18 DIAGNOSIS — Z23 ENCOUNTER FOR IMMUNIZATION: ICD-10-CM

## 2024-03-18 DIAGNOSIS — Z87.730 PERSONAL HISTORY OF (CORRECTED) CLEFT LIP AND PALATE: ICD-10-CM

## 2024-03-18 DIAGNOSIS — Z01.818 ENCOUNTER FOR OTHER PREPROCEDURAL EXAMINATION: ICD-10-CM

## 2024-03-18 DIAGNOSIS — E78.5 HYPERLIPIDEMIA, UNSPECIFIED: ICD-10-CM

## 2024-03-18 DIAGNOSIS — I10 ESSENTIAL (PRIMARY) HYPERTENSION: ICD-10-CM

## 2024-03-18 DIAGNOSIS — F98.8 OTHER SPECIFIED BEHAVIORAL AND EMOTIONAL DISORDERS WITH ONSET USUALLY OCCURRING IN CHILDHOOD AND ADOLESCENCE: ICD-10-CM

## 2024-03-18 LAB
BILIRUB UR QL STRIP: NORMAL
CLARITY UR: NORMAL
COLLECTION METHOD: NORMAL
GLUCOSE UR-MCNC: NORMAL
HCG UR QL: 0.2 EU/DL
HGB UR QL STRIP.AUTO: NORMAL
KETONES UR-MCNC: NORMAL
LEUKOCYTE ESTERASE UR QL STRIP: NORMAL
NITRITE UR QL STRIP: NORMAL
PH UR STRIP: 5.5
PROT UR STRIP-MCNC: NORMAL
SP GR UR STRIP: 1.01

## 2024-03-18 PROCEDURE — 99203 OFFICE O/P NEW LOW 30 MIN: CPT | Mod: 25

## 2024-03-18 PROCEDURE — 81003 URINALYSIS AUTO W/O SCOPE: CPT | Mod: QW

## 2024-03-18 PROCEDURE — G0438: CPT

## 2024-03-18 PROCEDURE — 90677 PCV20 VACCINE IM: CPT

## 2024-03-18 PROCEDURE — G0009: CPT

## 2024-03-18 PROCEDURE — 93000 ELECTROCARDIOGRAM COMPLETE: CPT

## 2024-03-18 RX ORDER — OLMESARTAN MEDOXOMIL 20 MG/1
20 TABLET, FILM COATED ORAL
Refills: 0 | Status: ACTIVE | COMMUNITY

## 2024-03-18 RX ORDER — METHYLPHENIDATE HYDROCHLORIDE 54 MG/1
54 TABLET, EXTENDED RELEASE ORAL
Refills: 0 | Status: ACTIVE | COMMUNITY

## 2024-03-18 RX ORDER — ATORVASTATIN CALCIUM 10 MG/1
10 TABLET, FILM COATED ORAL
Refills: 0 | Status: ACTIVE | COMMUNITY

## 2024-03-18 RX ORDER — BUPROPION HYDROCHLORIDE 100 MG/1
TABLET, FILM COATED ORAL
Refills: 0 | Status: ACTIVE | COMMUNITY

## 2024-03-18 NOTE — PHYSICAL EXAM
[No Acute Distress] : no acute distress [Well Nourished] : well nourished [Well Developed] : well developed [Well-Appearing] : well-appearing [Normal Sclera/Conjunctiva] : normal sclera/conjunctiva [Normal Outer Ear/Nose] : the outer ears and nose were normal in appearance [EOMI] : extraocular movements intact [PERRL] : pupils equal round and reactive to light [Normal Oropharynx] : the oropharynx was normal [No JVD] : no jugular venous distention [Supple] : supple [No Lymphadenopathy] : no lymphadenopathy [No Respiratory Distress] : no respiratory distress  [Thyroid Normal, No Nodules] : the thyroid was normal and there were no nodules present [Clear to Auscultation] : lungs were clear to auscultation bilaterally [No Accessory Muscle Use] : no accessory muscle use [Normal Rate] : normal rate  [Normal S1, S2] : normal S1 and S2 [Regular Rhythm] : with a regular rhythm [No Carotid Bruits] : no carotid bruits [No Murmur] : no murmur heard [No Abdominal Bruit] : a ~M bruit was not heard ~T in the abdomen [No Varicosities] : no varicosities [No Edema] : there was no peripheral edema [Pedal Pulses Present] : the pedal pulses are present [No Palpable Aorta] : no palpable aorta [No Extremity Clubbing/Cyanosis] : no extremity clubbing/cyanosis [Non Tender] : non-tender [Soft] : abdomen soft [Non-distended] : non-distended [No Masses] : no abdominal mass palpated [No HSM] : no HSM [Normal Bowel Sounds] : normal bowel sounds [No CVA Tenderness] : no CVA  tenderness [Normal Anterior Cervical Nodes] : no anterior cervical lymphadenopathy [Normal Posterior Cervical Nodes] : no posterior cervical lymphadenopathy [No Spinal Tenderness] : no spinal tenderness [No Joint Swelling] : no joint swelling [No Rash] : no rash [Grossly Normal Strength/Tone] : grossly normal strength/tone [Coordination Grossly Intact] : coordination grossly intact [No Focal Deficits] : no focal deficits [Normal Gait] : normal gait [Deep Tendon Reflexes (DTR)] : deep tendon reflexes were 2+ and symmetric [Normal Insight/Judgement] : insight and judgment were intact [Normal Affect] : the affect was normal [Declined Breast Exam] : declined breast exam  [Declined Rectal Exam] : declined rectal exam

## 2024-03-19 ENCOUNTER — APPOINTMENT (OUTPATIENT)
Dept: CARDIOLOGY | Facility: CLINIC | Age: 64
End: 2024-03-19
Payer: MEDICARE

## 2024-03-19 ENCOUNTER — NON-APPOINTMENT (OUTPATIENT)
Age: 64
End: 2024-03-19

## 2024-03-19 ENCOUNTER — OFFICE (OUTPATIENT)
Dept: URBAN - METROPOLITAN AREA CLINIC 12 | Facility: CLINIC | Age: 64
Setting detail: OPHTHALMOLOGY
End: 2024-03-19
Payer: MEDICARE

## 2024-03-19 VITALS
BODY MASS INDEX: 23.46 KG/M2 | HEART RATE: 74 BPM | WEIGHT: 146 LBS | DIASTOLIC BLOOD PRESSURE: 80 MMHG | SYSTOLIC BLOOD PRESSURE: 138 MMHG | HEIGHT: 66 IN | OXYGEN SATURATION: 100 %

## 2024-03-19 DIAGNOSIS — H25.13: ICD-10-CM

## 2024-03-19 DIAGNOSIS — H25.11: ICD-10-CM

## 2024-03-19 PROCEDURE — 93000 ELECTROCARDIOGRAM COMPLETE: CPT | Mod: NC

## 2024-03-19 PROCEDURE — 92136 OPHTHALMIC BIOMETRY: CPT | Mod: TC | Performed by: OPHTHALMOLOGY

## 2024-03-19 PROCEDURE — 99213 OFFICE O/P EST LOW 20 MIN: CPT | Performed by: OPHTHALMOLOGY

## 2024-03-19 PROCEDURE — 99204 OFFICE O/P NEW MOD 45 MIN: CPT

## 2024-03-19 PROCEDURE — 92136 OPHTHALMIC BIOMETRY: CPT | Mod: 26,RT | Performed by: OPHTHALMOLOGY

## 2024-03-19 NOTE — PHYSICAL EXAM
[Well Developed] : well developed [Well Nourished] : well nourished [No Acute Distress] : no acute distress [Normal Conjunctiva] : normal conjunctiva [Normal Venous Pressure] : normal venous pressure [No Carotid Bruit] : no carotid bruit [Normal S1, S2] : normal S1, S2 [No Murmur] : no murmur [No Rub] : no rub [Clear Lung Fields] : clear lung fields [No Gallop] : no gallop [Good Air Entry] : good air entry [No Respiratory Distress] : no respiratory distress  [Soft] : abdomen soft [Non Tender] : non-tender [No Masses/organomegaly] : no masses/organomegaly [Normal Bowel Sounds] : normal bowel sounds [No Edema] : no edema [Normal Gait] : normal gait [No Cyanosis] : no cyanosis [No Clubbing] : no clubbing [No Rash] : no rash [No Varicosities] : no varicosities [No Skin Lesions] : no skin lesions [Moves all extremities] : moves all extremities [No Focal Deficits] : no focal deficits [Normal Speech] : normal speech [Alert and Oriented] : alert and oriented [Normal memory] : normal memory

## 2024-03-19 NOTE — PHYSICAL EXAM
[Well Developed] : well developed [Well Nourished] : well nourished [No Acute Distress] : no acute distress [Normal Conjunctiva] : normal conjunctiva [Normal Venous Pressure] : normal venous pressure [No Carotid Bruit] : no carotid bruit [Normal S1, S2] : normal S1, S2 [No Murmur] : no murmur [No Rub] : no rub [Clear Lung Fields] : clear lung fields [No Gallop] : no gallop [Good Air Entry] : good air entry [No Respiratory Distress] : no respiratory distress  [Soft] : abdomen soft [Non Tender] : non-tender [No Masses/organomegaly] : no masses/organomegaly [Normal Bowel Sounds] : normal bowel sounds [No Edema] : no edema [Normal Gait] : normal gait [No Cyanosis] : no cyanosis [No Clubbing] : no clubbing [No Rash] : no rash [No Varicosities] : no varicosities [No Skin Lesions] : no skin lesions [Moves all extremities] : moves all extremities [No Focal Deficits] : no focal deficits [Normal Speech] : normal speech [Normal memory] : normal memory [Alert and Oriented] : alert and oriented

## 2024-03-19 NOTE — ASSESSMENT
[FreeTextEntry1] : A/P:  *preop clearance -low risk surgery cataract -h/o frequent PVCs that spontaneously resolved. -no cardiac symptoms. -functional capacity 7-10 METs -ECG today NSR no PVCs no ischemic changes.  -may proceed w/ surgery w/o further testing - low risk for cardiac events.  *PVCs -will request prior cardio notes -return in 2 months to review records.

## 2024-03-20 ENCOUNTER — LABORATORY RESULT (OUTPATIENT)
Age: 64
End: 2024-03-20

## 2024-03-21 DIAGNOSIS — E87.5 HYPERKALEMIA: ICD-10-CM

## 2024-03-21 DIAGNOSIS — R73.03 PREDIABETES.: ICD-10-CM

## 2024-03-21 PROBLEM — Z01.818 PRE-OP EXAMINATION: Status: ACTIVE | Noted: 2024-03-18

## 2024-03-21 LAB
ALBUMIN SERPL ELPH-MCNC: 4.2 G/DL
ALP BLD-CCNC: 80 U/L
ALT SERPL-CCNC: 21 U/L
ANION GAP SERPL CALC-SCNC: 11 MMOL/L
APPEARANCE: CLEAR
APPEARANCE: CLEAR
AST SERPL-CCNC: 31 U/L
BASOPHILS # BLD AUTO: 0.02 K/UL
BASOPHILS NFR BLD AUTO: 0.4 %
BILIRUB SERPL-MCNC: 0.3 MG/DL
BILIRUBIN URINE: NEGATIVE
BILIRUBIN URINE: NEGATIVE
BLOOD URINE: NEGATIVE
BLOOD URINE: NEGATIVE
BUN SERPL-MCNC: 13 MG/DL
CALCIUM SERPL-MCNC: 9.7 MG/DL
CHLORIDE SERPL-SCNC: 105 MMOL/L
CHOLEST SERPL-MCNC: 207 MG/DL
CO2 SERPL-SCNC: 28 MMOL/L
COLOR: YELLOW
COLOR: YELLOW
CREAT SERPL-MCNC: 0.79 MG/DL
EGFR: 82 ML/MIN/1.73M2
EOSINOPHIL # BLD AUTO: 0.28 K/UL
EOSINOPHIL NFR BLD AUTO: 5 %
ESTIMATED AVERAGE GLUCOSE: 146 MG/DL
GLUCOSE QUALITATIVE U: NEGATIVE MG/DL
GLUCOSE QUALITATIVE U: NEGATIVE MG/DL
GLUCOSE SERPL-MCNC: 92 MG/DL
HBA1C MFR BLD HPLC: 6.7 %
HCT VFR BLD CALC: 42 %
HDLC SERPL-MCNC: 99 MG/DL
HGB BLD-MCNC: 13.5 G/DL
IMM GRANULOCYTES NFR BLD AUTO: 0.2 %
KETONES URINE: NEGATIVE MG/DL
KETONES URINE: NEGATIVE MG/DL
LDLC SERPL CALC-MCNC: 98 MG/DL
LEUKOCYTE ESTERASE URINE: ABNORMAL
LEUKOCYTE ESTERASE URINE: ABNORMAL
LYMPHOCYTES # BLD AUTO: 1.74 K/UL
LYMPHOCYTES NFR BLD AUTO: 30.9 %
MAN DIFF?: NORMAL
MCHC RBC-ENTMCNC: 31.2 PG
MCHC RBC-ENTMCNC: 32.1 GM/DL
MCV RBC AUTO: 97 FL
MONOCYTES # BLD AUTO: 0.76 K/UL
MONOCYTES NFR BLD AUTO: 13.5 %
NEUTROPHILS # BLD AUTO: 2.82 K/UL
NEUTROPHILS NFR BLD AUTO: 50 %
NITRITE URINE: NEGATIVE
NITRITE URINE: NEGATIVE
NONHDLC SERPL-MCNC: 108 MG/DL
PH URINE: 6
PH URINE: 6
PLATELET # BLD AUTO: 286 K/UL
POTASSIUM SERPL-SCNC: 5.8 MMOL/L
PROT SERPL-MCNC: 6.7 G/DL
PROTEIN URINE: NEGATIVE MG/DL
PROTEIN URINE: NEGATIVE MG/DL
RBC # BLD: 4.33 M/UL
RBC # FLD: 13.6 %
SODIUM SERPL-SCNC: 143 MMOL/L
SPECIFIC GRAVITY URINE: 1.02
SPECIFIC GRAVITY URINE: 1.02
TRIGL SERPL-MCNC: 53 MG/DL
TSH SERPL-ACNC: 4.2 UIU/ML
UROBILINOGEN URINE: 0.2 MG/DL
UROBILINOGEN URINE: 0.2 MG/DL
WBC # FLD AUTO: 5.63 K/UL

## 2024-03-21 ASSESSMENT — REFRACTION_CURRENTRX
OD_SPHERE: +1.50
OD_OVR_VA: 20/
OS_OVR_VA: 20/
OD_ADD: +2.25
OD_AXIS: 61
OS_ADD: +2.25
OS_SPHERE: +1.75
OS_VPRISM_DIRECTION: PROGS
OD_VPRISM_DIRECTION: PROGS
OS_CYLINDER: -0.75
OS_AXIS: 109
OD_CYLINDER: -0.75

## 2024-03-21 ASSESSMENT — REFRACTION_MANIFEST
OS_CYLINDER: -0.75
OS_AXIS: 123
OD_VA1: 20/30-1
OS_SPHERE: +2.25
OD_SPHERE: +2.00
OD_AXIS: 59
OD_CYLINDER: -0.75

## 2024-03-21 ASSESSMENT — SPHEQUIV_DERIVED
OS_SPHEQUIV: 1.875
OD_SPHEQUIV: 1.625

## 2024-03-21 NOTE — PAST MEDICAL HISTORY
[Total Preg ___] : G[unfilled] [Live Births ___] : P[unfilled]  [AB Spont ___] : miscarriages: [unfilled]  [de-identified] : stpopped periodf 50

## 2024-03-21 NOTE — HEALTH RISK ASSESSMENT
[Very Good] : ~his/her~ current health as very good [2 - 3 times a week (3 pts)] : 2 - 3  times a week (3 points) [No falls in past year] : Patient reported no falls in the past year [0] : 1) Little interest or pleasure doing things: Not at all (0) [Employed] : employed [With Family] : lives with family [College] : College [] :  [# Of Children ___] : has [unfilled] children [Feels Safe at Home] : Feels safe at home [Smoke Detector] : smoke detector [Carbon Monoxide Detector] : carbon monoxide detector [Seat Belt] :  uses seat belt [Sunscreen] : uses sunscreen [Name: ___] : Health Care Proxy's Name: [unfilled]  [Relationship: ___] : Relationship: [unfilled] [Never] : Never [de-identified] : Goes to gym 3-4 day a week [de-identified] : home made [EyeExamDate] : 2023 [Change in mental status noted] : No change in mental status noted [Handling Complex Tasks] : denies difficulty handling complex tasks [Language] : denies difficulty with language [Reports changes in vision] : Reports no changes in vision [MammogramDate] : 1/56211 [Guns at Home] : no guns at home [BoneDensityComments] : osteopenia [BoneDensityDate] : 1//12022 [PapSmearDate] : 1/1/2023 [ColonoscopyDate] : 1/1/2023 [ColonoscopyComments] : f/u 5 years [FreeTextEntry2] : [place] [de-identified] : last hearing test 2023 [de-identified] : 2023 [AdvancecareDate] : 3/18/24

## 2024-03-21 NOTE — HISTORY OF PRESENT ILLNESS
[de-identified] : 63 y/o F left breast Ca diagnoses May/22 stage 1 triple negative lumpectomy left side, s/p chemo 16 treatment (August 2022/radiation ended Jan /24 by [facility]) get f/u 6 month x 3 years, HLD, HTN, SCC, ADD, osteopenia here for AWE.  Last physical 4 years at [facility]  No complains:  Has an upcoming cataract and Lasix surgery on 4/01/24     HCM: Vaccination: Flu 2023 Covid first series Tdap booster denies Shingrix not yet RSV not yet PNA vaccine not yet last eye , skin exam with 6 months UpToDate with colonoscopy, mammogram, pap, and dexa  Last Cardiologist Dr Gonsales AT [facility] had echo, holter, stress test and doppler done in 2022

## 2024-03-21 NOTE — ASSESSMENT
[FreeTextEntry1] : All preventative measures were reviewed with the patient and the patient is due for and agrees to the following as outlined  in the plan  below. f/u in six months referred to cardiology for cardiac Clearnce.  PCV today RSV and Shingrix advised to get at pharmacy patient agreeable. Gyn Dr Navarro, patient will continue with same

## 2024-03-21 NOTE — REASON FOR VISIT
[Annual Wellness Visit] : an annual wellness visit [FreeTextEntry1] :   63 y/o F PMhx thyroid nodules

## 2024-03-23 NOTE — HISTORY OF PRESENT ILLNESS
[FreeTextEntry1] : 65 y/o  here for preop clearance for cataract surgery april 1st then 15th.  pt reports h/o PVC followed by cardiologist for 10-15 yrs. 15 yrs ago PVCs noted on screening ECG in her 50s. Holter showed PVC burden of 5,000 approximately Repeat holter in 1 yr showed 10,000 next year 20,000 was started on metoprolol had side effects was stopped. holter 1 yr after 20,000 PVC showed no PVCs Subsequent holter after that showed almost no PVCs.  Initially saw cardio in [facility] then switch to [facility] 1 yr ago.  REviewed [facility] echo Feb '23: EF 66% normal LV size mildly increased thickness  Walks 30 mins 5x week w/ hills trying to go to gym 2-4 flights easily  lipids danny-10-'24: LDL 85  TG 50   no MI, stents, afib or CHF. has h/o TIA '21, HTN, HLP, ADD, depression. no cath, last stress '19 normal per pt.  family hx: dad - CHF, CABG x 4 starting age 60s   Bro -  ETOHism & heart issues related to that    ECG today NSR no ischemic changes  A/P:  *preop clearance -low risk surgery cataract -h/o frequent PVCs that spontaneously resolved. -no cardiac symptoms. -functional capacity 7-10 METs -ECG today NSR no PVCs no ischemic changes.  -may proceed w/ surgery w/o further testing - low risk for cardiac events.  *PVCs -will request prior cardio notes -return in 2 months to review records.

## 2024-03-23 NOTE — HISTORY OF PRESENT ILLNESS
[FreeTextEntry1] : 65 y/o  here for preop clearance for cataract surgery april 1st then 15th.  pt reports h/o PVC followed by cardiologist for 10-15 yrs. 15 yrs ago PVCs noted on screening ECG in her 50s. Holter showed PVC burden of 5,000 approximately Repeat holter in 1 yr showed 10,000 next year 20,000 was started on metoprolol had side effects was stopped. holter 1 yr after 20,000 PVC showed no PVCs Subsequent holter after that showed almost no PVCs.  Initially saw cardio in [facility] then switch to [facility] 1 yr ago.  REviewed [facility] echo Feb '23: EF 66% normal LV size mildly increased thickness  Walks 30 mins 5x week w/ hills trying to go to gym 2-4 flights easily  lipids danny-10-'24: LDL 85  TG 50   no MI, stents, afib or CHF. has h/o TIA '21, HTN, HLP, ADD, depression. no cath, last stress '19 normal per pt.  family hx: dad - CHF, CABG x 4 starting age 60s   Bro -  ETOHism & heart issues related to that    ECG today NSR no ischemic changes  A/P:  *preop clearance -low risk surgery cataract -h/o frequent PVCs that spontaneously resolved. -no cardiac symptoms. -functional capacity 7-10 METs -ECG today NSR no PVCs no ischemic changes.  -may proceed w/ surgery w/o further testing - low risk for cardiac events.  *PVCs -will request prior cardio notes -return in 2 months to review records.

## 2024-03-26 LAB
ALBUMIN SERPL ELPH-MCNC: 4.2 G/DL
ALP BLD-CCNC: 77 U/L
ALT SERPL-CCNC: 17 U/L
ANION GAP SERPL CALC-SCNC: 14 MMOL/L
AST SERPL-CCNC: 24 U/L
BILIRUB SERPL-MCNC: 0.2 MG/DL
BUN SERPL-MCNC: 13 MG/DL
CALCIUM SERPL-MCNC: 9.2 MG/DL
CHLORIDE SERPL-SCNC: 104 MMOL/L
CO2 SERPL-SCNC: 25 MMOL/L
CREAT SERPL-MCNC: 0.86 MG/DL
EGFR: 74 ML/MIN/1.73M2
ESTIMATED AVERAGE GLUCOSE: 108 MG/DL
GLUCOSE SERPL-MCNC: 96 MG/DL
HBA1C MFR BLD HPLC: 5.4 %
POTASSIUM SERPL-SCNC: 4.6 MMOL/L
PROT SERPL-MCNC: 6.6 G/DL
SODIUM SERPL-SCNC: 143 MMOL/L

## 2024-04-01 ENCOUNTER — ASC (OUTPATIENT)
Dept: URBAN - METROPOLITAN AREA SURGERY 8 | Facility: SURGERY | Age: 64
Setting detail: OPHTHALMOLOGY
End: 2024-04-01
Payer: MEDICARE

## 2024-04-01 DIAGNOSIS — H52.211: ICD-10-CM

## 2024-04-01 DIAGNOSIS — H25.11: ICD-10-CM

## 2024-04-01 PROCEDURE — A9270 NON-COVERED ITEM OR SERVICE: HCPCS | Mod: GY | Performed by: OPHTHALMOLOGY

## 2024-04-01 PROCEDURE — FEMTO FEMTOSECOND LASER: Mod: GY | Performed by: OPHTHALMOLOGY

## 2024-04-01 PROCEDURE — 66984 XCAPSL CTRC RMVL W/O ECP: CPT | Mod: RT | Performed by: OPHTHALMOLOGY

## 2024-04-02 ENCOUNTER — OFFICE (OUTPATIENT)
Dept: URBAN - METROPOLITAN AREA CLINIC 12 | Facility: CLINIC | Age: 64
Setting detail: OPHTHALMOLOGY
End: 2024-04-02
Payer: MEDICARE

## 2024-04-02 ENCOUNTER — RX ONLY (RX ONLY)
Age: 64
End: 2024-04-02

## 2024-04-02 DIAGNOSIS — H25.12: ICD-10-CM

## 2024-04-02 PROCEDURE — 92136 OPHTHALMIC BIOMETRY: CPT | Mod: 26,LT | Performed by: OPHTHALMOLOGY

## 2024-04-10 ENCOUNTER — OFFICE (OUTPATIENT)
Dept: URBAN - METROPOLITAN AREA CLINIC 12 | Facility: CLINIC | Age: 64
Setting detail: OPHTHALMOLOGY
End: 2024-04-10
Payer: MEDICARE

## 2024-04-10 DIAGNOSIS — Z96.1: ICD-10-CM

## 2024-04-10 PROCEDURE — 99024 POSTOP FOLLOW-UP VISIT: CPT | Performed by: OPTOMETRIST

## 2024-04-15 ENCOUNTER — ASC (OUTPATIENT)
Dept: URBAN - METROPOLITAN AREA SURGERY 8 | Facility: SURGERY | Age: 64
Setting detail: OPHTHALMOLOGY
End: 2024-04-15
Payer: MEDICARE

## 2024-04-15 DIAGNOSIS — H52.212: ICD-10-CM

## 2024-04-15 DIAGNOSIS — H25.12: ICD-10-CM

## 2024-04-15 PROCEDURE — FEMTO FEMTOSECOND LASER: Mod: GY | Performed by: OPHTHALMOLOGY

## 2024-04-15 PROCEDURE — A9270 NON-COVERED ITEM OR SERVICE: HCPCS | Mod: GY | Performed by: OPHTHALMOLOGY

## 2024-04-15 PROCEDURE — 66984 XCAPSL CTRC RMVL W/O ECP: CPT | Mod: 79,LT | Performed by: OPHTHALMOLOGY

## 2024-04-16 ENCOUNTER — OFFICE (OUTPATIENT)
Dept: URBAN - METROPOLITAN AREA CLINIC 12 | Facility: CLINIC | Age: 64
Setting detail: OPHTHALMOLOGY
End: 2024-04-16
Payer: MEDICARE

## 2024-04-16 DIAGNOSIS — Z96.1: ICD-10-CM

## 2024-04-16 PROCEDURE — 99024 POSTOP FOLLOW-UP VISIT: CPT | Performed by: OPHTHALMOLOGY

## 2024-04-25 ENCOUNTER — OFFICE (OUTPATIENT)
Dept: URBAN - METROPOLITAN AREA CLINIC 12 | Facility: CLINIC | Age: 64
Setting detail: OPHTHALMOLOGY
End: 2024-04-25
Payer: MEDICARE

## 2024-04-25 DIAGNOSIS — Z96.1: ICD-10-CM

## 2024-04-25 PROCEDURE — 99024 POSTOP FOLLOW-UP VISIT: CPT | Performed by: OPTOMETRIST

## 2024-05-21 ENCOUNTER — NON-APPOINTMENT (OUTPATIENT)
Age: 64
End: 2024-05-21

## 2024-05-21 ENCOUNTER — OFFICE (OUTPATIENT)
Dept: URBAN - METROPOLITAN AREA CLINIC 12 | Facility: CLINIC | Age: 64
Setting detail: OPHTHALMOLOGY
End: 2024-05-21
Payer: MEDICARE

## 2024-05-21 ENCOUNTER — APPOINTMENT (OUTPATIENT)
Dept: CARDIOLOGY | Facility: CLINIC | Age: 64
End: 2024-05-21
Payer: MEDICARE

## 2024-05-21 VITALS
WEIGHT: 141 LBS | HEIGHT: 66 IN | HEART RATE: 72 BPM | OXYGEN SATURATION: 98 % | SYSTOLIC BLOOD PRESSURE: 120 MMHG | DIASTOLIC BLOOD PRESSURE: 64 MMHG | BODY MASS INDEX: 22.66 KG/M2

## 2024-05-21 DIAGNOSIS — Z96.1: ICD-10-CM

## 2024-05-21 DIAGNOSIS — I49.3 VENTRICULAR PREMATURE DEPOLARIZATION: ICD-10-CM

## 2024-05-21 PROCEDURE — 99214 OFFICE O/P EST MOD 30 MIN: CPT

## 2024-05-21 PROCEDURE — 93000 ELECTROCARDIOGRAM COMPLETE: CPT

## 2024-05-21 PROCEDURE — 99024 POSTOP FOLLOW-UP VISIT: CPT | Performed by: OPHTHALMOLOGY

## 2024-05-21 PROCEDURE — G2211 COMPLEX E/M VISIT ADD ON: CPT

## 2024-05-21 ASSESSMENT — CONFRONTATIONAL VISUAL FIELD TEST (CVF)
OS_FINDINGS: FULL
OD_FINDINGS: FULL

## 2024-05-30 NOTE — HISTORY OF PRESENT ILLNESS
[FreeTextEntry1] : [id number]  ELSIE SUSIE  Feb 5 1960  here for followup. consulted for preop clearance for cataract surgery. PVCs noted on ECG.  Cleared for surgery Followup visit scheduled to evaluate PVCs after cataract surgery.  tolerated surgery w/o complications.

## 2024-05-30 NOTE — ASSESSMENT
[FreeTextEntry1] : A/P:  Epatch 3 days for PVC burden Echo to confirm normal EF  Return in 1 month

## 2024-06-11 ENCOUNTER — RESULT REVIEW (OUTPATIENT)
Age: 64
End: 2024-06-11

## 2024-06-11 ENCOUNTER — OUTPATIENT (OUTPATIENT)
Dept: OUTPATIENT SERVICES | Facility: HOSPITAL | Age: 64
LOS: 1 days | End: 2024-06-11
Payer: MEDICARE

## 2024-06-11 DIAGNOSIS — Z90.49 ACQUIRED ABSENCE OF OTHER SPECIFIED PARTS OF DIGESTIVE TRACT: Chronic | ICD-10-CM

## 2024-06-11 DIAGNOSIS — I49.3 VENTRICULAR PREMATURE DEPOLARIZATION: ICD-10-CM

## 2024-06-11 DIAGNOSIS — C50.919 MALIGNANT NEOPLASM OF UNSPECIFIED SITE OF UNSPECIFIED FEMALE BREAST: Chronic | ICD-10-CM

## 2024-06-11 DIAGNOSIS — Z98.890 OTHER SPECIFIED POSTPROCEDURAL STATES: Chronic | ICD-10-CM

## 2024-06-11 PROCEDURE — 93306 TTE W/DOPPLER COMPLETE: CPT | Mod: 26

## 2024-06-11 PROCEDURE — 93306 TTE W/DOPPLER COMPLETE: CPT

## 2024-06-12 DIAGNOSIS — I49.3 VENTRICULAR PREMATURE DEPOLARIZATION: ICD-10-CM

## 2024-07-09 ENCOUNTER — APPOINTMENT (OUTPATIENT)
Dept: CARDIOLOGY | Facility: CLINIC | Age: 64
End: 2024-07-09
Payer: MEDICARE

## 2024-07-09 VITALS
HEIGHT: 66 IN | SYSTOLIC BLOOD PRESSURE: 130 MMHG | WEIGHT: 144 LBS | OXYGEN SATURATION: 97 % | BODY MASS INDEX: 23.14 KG/M2 | HEART RATE: 83 BPM | DIASTOLIC BLOOD PRESSURE: 74 MMHG

## 2024-07-09 PROCEDURE — 93000 ELECTROCARDIOGRAM COMPLETE: CPT

## 2024-07-09 PROCEDURE — 99214 OFFICE O/P EST MOD 30 MIN: CPT

## 2024-10-02 ENCOUNTER — OFFICE (OUTPATIENT)
Dept: URBAN - METROPOLITAN AREA CLINIC 12 | Facility: CLINIC | Age: 64
Setting detail: OPHTHALMOLOGY
End: 2024-10-02
Payer: MEDICARE

## 2024-10-02 DIAGNOSIS — Z96.1: ICD-10-CM

## 2024-10-02 DIAGNOSIS — D31.31: ICD-10-CM

## 2024-10-02 DIAGNOSIS — H43.393: ICD-10-CM

## 2024-10-02 DIAGNOSIS — H47.323: ICD-10-CM

## 2024-10-02 DIAGNOSIS — H35.371: ICD-10-CM

## 2024-10-02 PROCEDURE — 92134 CPTRZ OPH DX IMG PST SGM RTA: CPT | Performed by: OPHTHALMOLOGY

## 2024-10-02 PROCEDURE — 92014 COMPRE OPH EXAM EST PT 1/>: CPT | Performed by: OPHTHALMOLOGY

## 2024-10-02 ASSESSMENT — REFRACTION_MANIFEST
OS_CYLINDER: -0.75
OD_VA1: 20/30-1
OS_SPHERE: +2.25
OD_CYLINDER: -0.75
OD_SPHERE: +2.00
OD_AXIS: 59
OS_AXIS: 123

## 2024-10-02 ASSESSMENT — KERATOMETRY
OD_AXISANGLE_DEGREES: 147
OS_AXISANGLE_DEGREES: 045
OS_K1POWER_DIOPTERS: 45.50
OD_K1POWER_DIOPTERS: 45.75
METHOD_AUTO_MANUAL: AUTO
OD_K2POWER_DIOPTERS: 46.50
OS_K2POWER_DIOPTERS: 46.00

## 2024-10-02 ASSESSMENT — REFRACTION_CURRENTRX
OS_ADD: +2.25
OD_OVR_VA: 20/
OD_ADD: +2.25
OD_CYLINDER: -0.75
OD_VPRISM_DIRECTION: PROGS
OS_VPRISM_DIRECTION: PROGS
OD_SPHERE: +1.50
OS_OVR_VA: 20/
OS_AXIS: 109
OD_AXIS: 61
OS_SPHERE: +1.75
OS_CYLINDER: -0.75

## 2024-10-02 ASSESSMENT — CONFRONTATIONAL VISUAL FIELD TEST (CVF)
OD_FINDINGS: FULL
OS_FINDINGS: FULL

## 2024-10-02 ASSESSMENT — REFRACTION_AUTOREFRACTION
OD_AXIS: 090
OD_CYLINDER: -1.00
OD_SPHERE: +0.50
OS_SPHERE: +0.50
OS_AXIS: 117
OS_CYLINDER: -1.00

## 2024-10-02 ASSESSMENT — VISUAL ACUITY
OS_BCVA: 20/30
OD_BCVA: 20/20

## 2024-10-02 ASSESSMENT — TONOMETRY
OS_IOP_MMHG: 12
OD_IOP_MMHG: 11

## 2024-10-08 ENCOUNTER — NON-APPOINTMENT (OUTPATIENT)
Age: 64
End: 2024-10-08

## 2024-10-29 ENCOUNTER — NON-APPOINTMENT (OUTPATIENT)
Age: 64
End: 2024-10-29

## 2024-12-21 ENCOUNTER — NON-APPOINTMENT (OUTPATIENT)
Age: 64
End: 2024-12-21

## 2025-01-04 ENCOUNTER — NON-APPOINTMENT (OUTPATIENT)
Age: 65
End: 2025-01-04

## 2025-01-08 ENCOUNTER — NON-APPOINTMENT (OUTPATIENT)
Age: 65
End: 2025-01-08

## 2025-01-08 ENCOUNTER — APPOINTMENT (OUTPATIENT)
Dept: CARDIOLOGY | Facility: CLINIC | Age: 65
End: 2025-01-08

## 2025-01-08 VITALS
SYSTOLIC BLOOD PRESSURE: 128 MMHG | BODY MASS INDEX: 22.98 KG/M2 | HEIGHT: 66 IN | OXYGEN SATURATION: 98 % | WEIGHT: 143 LBS | DIASTOLIC BLOOD PRESSURE: 82 MMHG | HEART RATE: 78 BPM

## 2025-01-08 PROCEDURE — 99214 OFFICE O/P EST MOD 30 MIN: CPT

## 2025-01-08 PROCEDURE — 93000 ELECTROCARDIOGRAM COMPLETE: CPT

## 2025-01-08 PROCEDURE — G2211 COMPLEX E/M VISIT ADD ON: CPT

## 2025-02-23 ENCOUNTER — NON-APPOINTMENT (OUTPATIENT)
Age: 65
End: 2025-02-23

## 2025-03-25 ENCOUNTER — APPOINTMENT (OUTPATIENT)
Dept: INTERNAL MEDICINE | Facility: CLINIC | Age: 65
End: 2025-03-25
Payer: MEDICARE

## 2025-03-25 VITALS
DIASTOLIC BLOOD PRESSURE: 80 MMHG | OXYGEN SATURATION: 99 % | TEMPERATURE: 98.3 F | HEIGHT: 66 IN | SYSTOLIC BLOOD PRESSURE: 106 MMHG | BODY MASS INDEX: 22.82 KG/M2 | HEART RATE: 67 BPM | WEIGHT: 142 LBS

## 2025-03-25 DIAGNOSIS — Z13.820 ENCOUNTER FOR SCREENING FOR OSTEOPOROSIS: ICD-10-CM

## 2025-03-25 DIAGNOSIS — I10 ESSENTIAL (PRIMARY) HYPERTENSION: ICD-10-CM

## 2025-03-25 DIAGNOSIS — E78.5 HYPERLIPIDEMIA, UNSPECIFIED: ICD-10-CM

## 2025-03-25 DIAGNOSIS — R73.03 PREDIABETES.: ICD-10-CM

## 2025-03-25 DIAGNOSIS — Z23 ENCOUNTER FOR IMMUNIZATION: ICD-10-CM

## 2025-03-25 DIAGNOSIS — F98.8 OTHER SPECIFIED BEHAVIORAL AND EMOTIONAL DISORDERS WITH ONSET USUALLY OCCURRING IN CHILDHOOD AND ADOLESCENCE: ICD-10-CM

## 2025-03-25 DIAGNOSIS — Z00.00 ENCOUNTER FOR GENERAL ADULT MEDICAL EXAMINATION W/OUT ABNORMAL FINDINGS: ICD-10-CM

## 2025-03-25 PROCEDURE — G0439: CPT

## 2025-04-02 LAB
25(OH)D3 SERPL-MCNC: 58 NG/ML
ALBUMIN SERPL ELPH-MCNC: 4 G/DL
ALP BLD-CCNC: 64 U/L
ALT SERPL-CCNC: 15 U/L
ANION GAP SERPL CALC-SCNC: 13 MMOL/L
AST SERPL-CCNC: 25 U/L
BASOPHILS # BLD AUTO: 0.05 K/UL
BASOPHILS NFR BLD AUTO: 0.8 %
BILIRUB SERPL-MCNC: 0.2 MG/DL
BUN SERPL-MCNC: 14 MG/DL
CALCIUM SERPL-MCNC: 9.3 MG/DL
CHLORIDE SERPL-SCNC: 104 MMOL/L
CHOLEST SERPL-MCNC: 205 MG/DL
CO2 SERPL-SCNC: 26 MMOL/L
CREAT SERPL-MCNC: 0.9 MG/DL
EGFRCR SERPLBLD CKD-EPI 2021: 70 ML/MIN/1.73M2
EOSINOPHIL # BLD AUTO: 0.4 K/UL
EOSINOPHIL NFR BLD AUTO: 6.7 %
ESTIMATED AVERAGE GLUCOSE: 123 MG/DL
GLUCOSE SERPL-MCNC: 93 MG/DL
HBA1C MFR BLD HPLC: 5.9 %
HCT VFR BLD CALC: 41.5 %
HDLC SERPL-MCNC: 106 MG/DL
HGB BLD-MCNC: 13.3 G/DL
IMM GRANULOCYTES NFR BLD AUTO: 0.2 %
LDLC SERPL-MCNC: 90 MG/DL
LYMPHOCYTES # BLD AUTO: 1.97 K/UL
LYMPHOCYTES NFR BLD AUTO: 32.8 %
MAN DIFF?: NORMAL
MCHC RBC-ENTMCNC: 30.9 PG
MCHC RBC-ENTMCNC: 32 G/DL
MCV RBC AUTO: 96.5 FL
MONOCYTES # BLD AUTO: 0.78 K/UL
MONOCYTES NFR BLD AUTO: 13 %
NEUTROPHILS # BLD AUTO: 2.8 K/UL
NEUTROPHILS NFR BLD AUTO: 46.5 %
NONHDLC SERPL-MCNC: 98 MG/DL
PLATELET # BLD AUTO: 281 K/UL
POTASSIUM SERPL-SCNC: 4.6 MMOL/L
PROT SERPL-MCNC: 6.4 G/DL
RBC # BLD: 4.3 M/UL
RBC # FLD: 14.1 %
SODIUM SERPL-SCNC: 143 MMOL/L
TRIGL SERPL-MCNC: 46 MG/DL
TSH SERPL-ACNC: 3.43 UIU/ML
WBC # FLD AUTO: 6.01 K/UL

## 2025-04-15 ENCOUNTER — OFFICE (OUTPATIENT)
Dept: URBAN - METROPOLITAN AREA CLINIC 12 | Facility: CLINIC | Age: 65
Setting detail: OPHTHALMOLOGY
End: 2025-04-15
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
## 2025-04-15 PROCEDURE — NO SHOW FE NO SHOW FEE: Performed by: OPHTHALMOLOGY

## 2025-06-15 ENCOUNTER — NON-APPOINTMENT (OUTPATIENT)
Age: 65
End: 2025-06-15

## 2025-06-19 ENCOUNTER — NON-APPOINTMENT (OUTPATIENT)
Age: 65
End: 2025-06-19

## 2025-06-20 ENCOUNTER — APPOINTMENT (OUTPATIENT)
Dept: CARDIOLOGY | Facility: CLINIC | Age: 65
End: 2025-06-20
Payer: MEDICARE

## 2025-06-20 VITALS
HEIGHT: 66 IN | OXYGEN SATURATION: 100 % | HEART RATE: 69 BPM | SYSTOLIC BLOOD PRESSURE: 114 MMHG | WEIGHT: 145 LBS | BODY MASS INDEX: 23.3 KG/M2 | DIASTOLIC BLOOD PRESSURE: 70 MMHG

## 2025-06-20 PROCEDURE — G2211 COMPLEX E/M VISIT ADD ON: CPT

## 2025-06-20 PROCEDURE — 93000 ELECTROCARDIOGRAM COMPLETE: CPT

## 2025-06-20 PROCEDURE — 99214 OFFICE O/P EST MOD 30 MIN: CPT

## 2025-06-20 RX ORDER — BUPROPION HYDROCHLORIDE 300 MG/1
300 TABLET, EXTENDED RELEASE ORAL DAILY
Refills: 0 | Status: ACTIVE | COMMUNITY

## 2025-06-23 ENCOUNTER — RX RENEWAL (OUTPATIENT)
Age: 65
End: 2025-06-23

## 2025-07-03 ENCOUNTER — APPOINTMENT (OUTPATIENT)
Dept: CARDIOLOGY | Facility: CLINIC | Age: 65
End: 2025-07-03

## 2025-07-03 ENCOUNTER — APPOINTMENT (OUTPATIENT)
Dept: CARDIOLOGY | Facility: CLINIC | Age: 65
End: 2025-07-03
Payer: MEDICARE

## 2025-07-03 PROCEDURE — G2211 COMPLEX E/M VISIT ADD ON: CPT | Mod: 2W

## 2025-07-03 PROCEDURE — 99212 OFFICE O/P EST SF 10 MIN: CPT | Mod: 2W

## 2025-07-08 ENCOUNTER — APPOINTMENT (OUTPATIENT)
Dept: CARDIOLOGY | Facility: CLINIC | Age: 65
End: 2025-07-08

## 2025-07-21 ENCOUNTER — APPOINTMENT (OUTPATIENT)
Dept: INTERNAL MEDICINE | Facility: CLINIC | Age: 65
End: 2025-07-21
Payer: MEDICARE

## 2025-07-21 VITALS
OXYGEN SATURATION: 97 % | SYSTOLIC BLOOD PRESSURE: 118 MMHG | BODY MASS INDEX: 22.82 KG/M2 | HEIGHT: 66 IN | TEMPERATURE: 97.4 F | WEIGHT: 142 LBS | DIASTOLIC BLOOD PRESSURE: 62 MMHG | HEART RATE: 75 BPM

## 2025-07-21 DIAGNOSIS — L65.9 NONSCARRING HAIR LOSS, UNSPECIFIED: ICD-10-CM

## 2025-07-21 DIAGNOSIS — R73.03 PREDIABETES.: ICD-10-CM

## 2025-07-21 DIAGNOSIS — I10 ESSENTIAL (PRIMARY) HYPERTENSION: ICD-10-CM

## 2025-07-21 DIAGNOSIS — I49.3 VENTRICULAR PREMATURE DEPOLARIZATION: ICD-10-CM

## 2025-07-21 DIAGNOSIS — E78.5 HYPERLIPIDEMIA, UNSPECIFIED: ICD-10-CM

## 2025-07-21 DIAGNOSIS — F98.8 OTHER SPECIFIED BEHAVIORAL AND EMOTIONAL DISORDERS WITH ONSET USUALLY OCCURRING IN CHILDHOOD AND ADOLESCENCE: ICD-10-CM

## 2025-07-21 DIAGNOSIS — F32.A DEPRESSION, UNSPECIFIED: ICD-10-CM

## 2025-07-21 DIAGNOSIS — R42 DIZZINESS AND GIDDINESS: ICD-10-CM

## 2025-07-21 PROCEDURE — 99214 OFFICE O/P EST MOD 30 MIN: CPT

## 2025-07-21 RX ORDER — MINOXIDIL 2.5 MG/1
TABLET ORAL
Refills: 0 | Status: ACTIVE | COMMUNITY

## 2025-07-22 LAB
ALBUMIN SERPL ELPH-MCNC: 4.5 G/DL
ALP BLD-CCNC: 72 U/L
ALT SERPL-CCNC: 22 U/L
ANION GAP SERPL CALC-SCNC: 16 MMOL/L
AST SERPL-CCNC: 30 U/L
BASOPHILS # BLD AUTO: 0.03 K/UL
BASOPHILS NFR BLD AUTO: 0.5 %
BILIRUB SERPL-MCNC: 0.3 MG/DL
BUN SERPL-MCNC: 16 MG/DL
CALCIUM SERPL-MCNC: 9.8 MG/DL
CHLORIDE SERPL-SCNC: 104 MMOL/L
CHOLEST SERPL-MCNC: 236 MG/DL
CO2 SERPL-SCNC: 21 MMOL/L
CREAT SERPL-MCNC: 0.82 MG/DL
EGFRCR SERPLBLD CKD-EPI 2021: 78 ML/MIN/1.73M2
EOSINOPHIL # BLD AUTO: 0.27 K/UL
EOSINOPHIL NFR BLD AUTO: 4.9 %
ESTIMATED AVERAGE GLUCOSE: 120 MG/DL
GLUCOSE SERPL-MCNC: 99 MG/DL
HBA1C MFR BLD HPLC: 5.8 %
HCT VFR BLD CALC: 47.2 %
HDLC SERPL-MCNC: 112 MG/DL
HGB BLD-MCNC: 15.3 G/DL
IMM GRANULOCYTES NFR BLD AUTO: 0.2 %
LDLC SERPL-MCNC: 113 MG/DL
LYMPHOCYTES # BLD AUTO: 1.68 K/UL
LYMPHOCYTES NFR BLD AUTO: 30.4 %
MAN DIFF?: NORMAL
MCHC RBC-ENTMCNC: 31.5 PG
MCHC RBC-ENTMCNC: 32.4 G/DL
MCV RBC AUTO: 97.1 FL
MONOCYTES # BLD AUTO: 0.75 K/UL
MONOCYTES NFR BLD AUTO: 13.6 %
NEUTROPHILS # BLD AUTO: 2.78 K/UL
NEUTROPHILS NFR BLD AUTO: 50.4 %
NONHDLC SERPL-MCNC: 123 MG/DL
PLATELET # BLD AUTO: 274 K/UL
POTASSIUM SERPL-SCNC: 5.2 MMOL/L
PROT SERPL-MCNC: 7.2 G/DL
RBC # BLD: 4.86 M/UL
RBC # FLD: 13.3 %
SODIUM SERPL-SCNC: 141 MMOL/L
TRIGL SERPL-MCNC: 60 MG/DL
TSH SERPL-ACNC: 2.88 UIU/ML
WBC # FLD AUTO: 5.52 K/UL

## 2025-07-23 ENCOUNTER — OFFICE (OUTPATIENT)
Dept: URBAN - METROPOLITAN AREA CLINIC 12 | Facility: CLINIC | Age: 65
Setting detail: OPHTHALMOLOGY
End: 2025-07-23
Payer: MEDICARE

## 2025-07-23 DIAGNOSIS — H35.373: ICD-10-CM

## 2025-07-23 DIAGNOSIS — H47.323: ICD-10-CM

## 2025-07-23 DIAGNOSIS — H52.7: ICD-10-CM

## 2025-07-23 DIAGNOSIS — Z96.1: ICD-10-CM

## 2025-07-23 DIAGNOSIS — H43.393: ICD-10-CM

## 2025-07-23 DIAGNOSIS — D31.31: ICD-10-CM

## 2025-07-23 PROCEDURE — 92250 FUNDUS PHOTOGRAPHY W/I&R: CPT | Performed by: OPHTHALMOLOGY

## 2025-07-23 PROCEDURE — 92134 CPTRZ OPH DX IMG PST SGM RTA: CPT | Performed by: OPHTHALMOLOGY

## 2025-07-23 PROCEDURE — 92014 COMPRE OPH EXAM EST PT 1/>: CPT | Performed by: OPHTHALMOLOGY

## 2025-07-23 ASSESSMENT — KERATOMETRY
OD_K1POWER_DIOPTERS: 46.00
OS_AXISANGLE_DEGREES: 061
OS_K2POWER_DIOPTERS: 46.00
OD_AXISANGLE_DEGREES: 162
OS_K1POWER_DIOPTERS: 45.50
OD_K2POWER_DIOPTERS: 46.25
METHOD_AUTO_MANUAL: AUTO

## 2025-07-23 ASSESSMENT — TONOMETRY
OS_IOP_MMHG: 13
OD_IOP_MMHG: 17

## 2025-07-23 ASSESSMENT — REFRACTION_AUTOREFRACTION
OD_AXIS: 088
OD_SPHERE: +0.50
OS_SPHERE: +0.25
OS_CYLINDER: -1.00
OS_AXIS: 106
OD_CYLINDER: -1.00

## 2025-07-23 ASSESSMENT — REFRACTION_CURRENTRX
OD_SPHERE: +1.50
OS_ADD: +2.25
OD_OVR_VA: 20/
OD_CYLINDER: -0.75
OS_VPRISM_DIRECTION: PROGS
OD_AXIS: 61
OD_ADD: +2.25
OS_OVR_VA: 20/
OD_VPRISM_DIRECTION: PROGS
OS_SPHERE: +1.75
OS_AXIS: 109
OS_CYLINDER: -0.75

## 2025-07-23 ASSESSMENT — VISUAL ACUITY
OS_BCVA: 20/25-2
OD_BCVA: 20/25

## 2025-07-23 ASSESSMENT — REFRACTION_MANIFEST
OD_VA1: 20/25-2
OS_SPHERE: +0.25
OD_SPHERE: +0.50
OD_CYLINDER: -1.00
OS_VA1: 20/20
OS_CYLINDER: -1.00
OS_AXIS: 106
OD_AXIS: 088

## 2025-07-23 ASSESSMENT — CONFRONTATIONAL VISUAL FIELD TEST (CVF)
OD_FINDINGS: FULL
OS_FINDINGS: FULL

## 2025-09-11 ENCOUNTER — APPOINTMENT (OUTPATIENT)
Dept: INTERNAL MEDICINE | Facility: CLINIC | Age: 65
End: 2025-09-11
Payer: MEDICARE

## 2025-09-11 VITALS
WEIGHT: 145 LBS | TEMPERATURE: 98.4 F | OXYGEN SATURATION: 98 % | DIASTOLIC BLOOD PRESSURE: 74 MMHG | SYSTOLIC BLOOD PRESSURE: 126 MMHG | HEART RATE: 90 BPM | HEIGHT: 66 IN | BODY MASS INDEX: 23.3 KG/M2

## 2025-09-11 DIAGNOSIS — E78.5 HYPERLIPIDEMIA, UNSPECIFIED: ICD-10-CM

## 2025-09-11 DIAGNOSIS — L65.9 NONSCARRING HAIR LOSS, UNSPECIFIED: ICD-10-CM

## 2025-09-11 DIAGNOSIS — I10 ESSENTIAL (PRIMARY) HYPERTENSION: ICD-10-CM

## 2025-09-11 DIAGNOSIS — Z23 ENCOUNTER FOR IMMUNIZATION: ICD-10-CM

## 2025-09-11 PROCEDURE — 99214 OFFICE O/P EST MOD 30 MIN: CPT

## 2025-09-11 PROCEDURE — G0008: CPT

## 2025-09-11 PROCEDURE — G2211 COMPLEX E/M VISIT ADD ON: CPT

## 2025-09-11 PROCEDURE — 90662 IIV NO PRSV INCREASED AG IM: CPT

## 2025-09-12 ENCOUNTER — APPOINTMENT (OUTPATIENT)
Dept: INTERNAL MEDICINE | Facility: CLINIC | Age: 65
End: 2025-09-12

## 2025-09-17 ENCOUNTER — RX RENEWAL (OUTPATIENT)
Age: 65
End: 2025-09-17